# Patient Record
Sex: MALE | Race: WHITE | NOT HISPANIC OR LATINO
[De-identification: names, ages, dates, MRNs, and addresses within clinical notes are randomized per-mention and may not be internally consistent; named-entity substitution may affect disease eponyms.]

---

## 2017-10-13 ENCOUNTER — APPOINTMENT (OUTPATIENT)
Dept: RHEUMATOLOGY | Facility: CLINIC | Age: 82
End: 2017-10-13
Payer: MEDICARE

## 2017-10-13 VITALS
DIASTOLIC BLOOD PRESSURE: 82 MMHG | OXYGEN SATURATION: 97 % | HEIGHT: 63 IN | HEART RATE: 77 BPM | BODY MASS INDEX: 32.78 KG/M2 | SYSTOLIC BLOOD PRESSURE: 134 MMHG | WEIGHT: 185 LBS

## 2017-10-13 PROCEDURE — 99204 OFFICE O/P NEW MOD 45 MIN: CPT

## 2017-10-13 RX ORDER — CHROMIUM 200 MCG
1000 TABLET ORAL DAILY
Qty: 30 | Refills: 6 | Status: ACTIVE | COMMUNITY
Start: 2017-10-13 | End: 1900-01-01

## 2018-01-16 ENCOUNTER — APPOINTMENT (OUTPATIENT)
Dept: RHEUMATOLOGY | Facility: CLINIC | Age: 83
End: 2018-01-16
Payer: MEDICARE

## 2018-01-16 VITALS
DIASTOLIC BLOOD PRESSURE: 82 MMHG | OXYGEN SATURATION: 95 % | BODY MASS INDEX: 33.31 KG/M2 | HEIGHT: 63 IN | HEART RATE: 94 BPM | SYSTOLIC BLOOD PRESSURE: 158 MMHG | WEIGHT: 188 LBS

## 2018-01-16 PROCEDURE — 99215 OFFICE O/P EST HI 40 MIN: CPT

## 2018-03-12 ENCOUNTER — APPOINTMENT (OUTPATIENT)
Dept: RHEUMATOLOGY | Facility: CLINIC | Age: 83
End: 2018-03-12
Payer: MEDICARE

## 2018-03-12 VITALS — SYSTOLIC BLOOD PRESSURE: 162 MMHG | OXYGEN SATURATION: 96 % | DIASTOLIC BLOOD PRESSURE: 72 MMHG | HEART RATE: 95 BPM

## 2018-03-12 DIAGNOSIS — B02.29 OTHER POSTHERPETIC NERVOUS SYSTEM INVOLVEMENT: ICD-10-CM

## 2018-03-12 DIAGNOSIS — F33.0 MAJOR DEPRESSIVE DISORDER, RECURRENT, MILD: ICD-10-CM

## 2018-03-12 PROCEDURE — 99214 OFFICE O/P EST MOD 30 MIN: CPT

## 2018-05-14 ENCOUNTER — OUTPATIENT (OUTPATIENT)
Dept: OUTPATIENT SERVICES | Facility: HOSPITAL | Age: 83
LOS: 1 days | End: 2018-05-14
Payer: MEDICARE

## 2018-05-14 ENCOUNTER — APPOINTMENT (OUTPATIENT)
Dept: RHEUMATOLOGY | Facility: CLINIC | Age: 83
End: 2018-05-14
Payer: MEDICARE

## 2018-05-14 VITALS
BODY MASS INDEX: 34.38 KG/M2 | OXYGEN SATURATION: 95 % | HEIGHT: 63 IN | HEART RATE: 89 BPM | WEIGHT: 194 LBS | SYSTOLIC BLOOD PRESSURE: 181 MMHG | DIASTOLIC BLOOD PRESSURE: 94 MMHG

## 2018-05-14 DIAGNOSIS — Z98.89 OTHER SPECIFIED POSTPROCEDURAL STATES: Chronic | ICD-10-CM

## 2018-05-14 DIAGNOSIS — M19.90 UNSPECIFIED OSTEOARTHRITIS, UNSPECIFIED SITE: ICD-10-CM

## 2018-05-14 DIAGNOSIS — Z90.89 ACQUIRED ABSENCE OF OTHER ORGANS: Chronic | ICD-10-CM

## 2018-05-14 PROCEDURE — 99214 OFFICE O/P EST MOD 30 MIN: CPT

## 2018-05-14 PROCEDURE — 73562 X-RAY EXAM OF KNEE 3: CPT | Mod: 26,LT

## 2018-05-14 PROCEDURE — 73562 X-RAY EXAM OF KNEE 3: CPT

## 2018-06-19 ENCOUNTER — APPOINTMENT (OUTPATIENT)
Dept: ORTHOPEDIC SURGERY | Facility: CLINIC | Age: 83
End: 2018-06-19
Payer: MEDICARE

## 2018-06-19 PROCEDURE — 99203 OFFICE O/P NEW LOW 30 MIN: CPT

## 2018-07-16 ENCOUNTER — APPOINTMENT (OUTPATIENT)
Dept: ORTHOPEDIC SURGERY | Facility: CLINIC | Age: 83
End: 2018-07-16
Payer: MEDICARE

## 2018-07-16 VITALS — WEIGHT: 194 LBS | BODY MASS INDEX: 34.38 KG/M2 | HEIGHT: 63 IN

## 2018-07-16 PROCEDURE — 20610 DRAIN/INJ JOINT/BURSA W/O US: CPT | Mod: LT

## 2018-07-16 PROCEDURE — 99213 OFFICE O/P EST LOW 20 MIN: CPT | Mod: 25

## 2018-07-17 RX ORDER — NAPROXEN 250 MG/1
250 TABLET ORAL
Qty: 360 | Refills: 1 | Status: ACTIVE | COMMUNITY
Start: 2018-07-17 | End: 1900-01-01

## 2018-08-27 ENCOUNTER — APPOINTMENT (OUTPATIENT)
Dept: ORTHOPEDIC SURGERY | Facility: CLINIC | Age: 83
End: 2018-08-27
Payer: MEDICARE

## 2018-08-27 VITALS — HEIGHT: 63 IN | WEIGHT: 194 LBS | BODY MASS INDEX: 34.38 KG/M2

## 2018-08-27 PROCEDURE — 99213 OFFICE O/P EST LOW 20 MIN: CPT | Mod: 25

## 2018-08-27 PROCEDURE — 20610 DRAIN/INJ JOINT/BURSA W/O US: CPT | Mod: LT

## 2018-09-14 ENCOUNTER — APPOINTMENT (OUTPATIENT)
Dept: RHEUMATOLOGY | Facility: CLINIC | Age: 83
End: 2018-09-14
Payer: MEDICARE

## 2018-09-14 VITALS — OXYGEN SATURATION: 95 % | HEART RATE: 92 BPM | DIASTOLIC BLOOD PRESSURE: 88 MMHG | SYSTOLIC BLOOD PRESSURE: 156 MMHG

## 2018-09-14 PROCEDURE — 99215 OFFICE O/P EST HI 40 MIN: CPT

## 2018-09-14 RX ORDER — DICLOFENAC SODIUM 10 MG/G
1 GEL TOPICAL DAILY
Qty: 1 | Refills: 2 | Status: ACTIVE | COMMUNITY
Start: 2018-09-14 | End: 1900-01-01

## 2018-10-24 ENCOUNTER — RX RENEWAL (OUTPATIENT)
Age: 83
End: 2018-10-24

## 2018-10-24 RX ORDER — MULTIVIT-MIN/FOLIC/VIT K/LYCOP 400-300MCG
25 MCG TABLET ORAL
Qty: 30 | Refills: 0 | Status: ACTIVE | COMMUNITY
Start: 2018-10-24 | End: 1900-01-01

## 2018-11-02 ENCOUNTER — APPOINTMENT (OUTPATIENT)
Dept: RHEUMATOLOGY | Facility: CLINIC | Age: 83
End: 2018-11-02
Payer: MEDICARE

## 2018-11-02 ENCOUNTER — OUTPATIENT (OUTPATIENT)
Dept: OUTPATIENT SERVICES | Facility: HOSPITAL | Age: 83
LOS: 1 days | End: 2018-11-02
Payer: MEDICARE

## 2018-11-02 ENCOUNTER — APPOINTMENT (OUTPATIENT)
Dept: ULTRASOUND IMAGING | Facility: HOSPITAL | Age: 83
End: 2018-11-02

## 2018-11-02 VITALS
HEIGHT: 63 IN | WEIGHT: 186 LBS | OXYGEN SATURATION: 95 % | TEMPERATURE: 98.8 F | HEART RATE: 100 BPM | BODY MASS INDEX: 32.96 KG/M2 | SYSTOLIC BLOOD PRESSURE: 176 MMHG | DIASTOLIC BLOOD PRESSURE: 98 MMHG

## 2018-11-02 DIAGNOSIS — Z90.89 ACQUIRED ABSENCE OF OTHER ORGANS: Chronic | ICD-10-CM

## 2018-11-02 DIAGNOSIS — Z98.89 OTHER SPECIFIED POSTPROCEDURAL STATES: Chronic | ICD-10-CM

## 2018-11-02 PROCEDURE — 93971 EXTREMITY STUDY: CPT | Mod: 26,LT

## 2018-11-02 PROCEDURE — 93971 EXTREMITY STUDY: CPT

## 2018-11-02 PROCEDURE — 99214 OFFICE O/P EST MOD 30 MIN: CPT

## 2018-11-20 ENCOUNTER — APPOINTMENT (OUTPATIENT)
Dept: ORTHOPEDIC SURGERY | Facility: CLINIC | Age: 83
End: 2018-11-20
Payer: MEDICARE

## 2018-11-20 VITALS — BODY MASS INDEX: 32.96 KG/M2 | WEIGHT: 186 LBS | HEIGHT: 63 IN

## 2018-11-20 DIAGNOSIS — R00.9 UNSPECIFIED ABNORMALITIES OF HEART BEAT: ICD-10-CM

## 2018-11-20 DIAGNOSIS — Z86.59 PERSONAL HISTORY OF OTHER MENTAL AND BEHAVIORAL DISORDERS: ICD-10-CM

## 2018-11-20 DIAGNOSIS — Z86.69 PERSONAL HISTORY OF OTHER DISEASES OF THE NERVOUS SYSTEM AND SENSE ORGANS: ICD-10-CM

## 2018-11-20 DIAGNOSIS — Z87.2 PERSONAL HISTORY OF DISEASES OF THE SKIN AND SUBCUTANEOUS TISSUE: ICD-10-CM

## 2018-11-20 DIAGNOSIS — Z87.39 PERSONAL HISTORY OF OTHER DISEASES OF THE MUSCULOSKELETAL SYSTEM AND CONNECTIVE TISSUE: ICD-10-CM

## 2018-11-20 DIAGNOSIS — Z86.39 PERSONAL HISTORY OF OTHER ENDOCRINE, NUTRITIONAL AND METABOLIC DISEASE: ICD-10-CM

## 2018-11-20 DIAGNOSIS — N40.0 BENIGN PROSTATIC HYPERPLASIA WITHOUT LOWER URINARY TRACT SYMPMS: ICD-10-CM

## 2018-11-20 DIAGNOSIS — Z86.79 PERSONAL HISTORY OF OTHER DISEASES OF THE CIRCULATORY SYSTEM: ICD-10-CM

## 2018-11-20 PROCEDURE — 99213 OFFICE O/P EST LOW 20 MIN: CPT

## 2018-11-28 VITALS
OXYGEN SATURATION: 96 % | DIASTOLIC BLOOD PRESSURE: 84 MMHG | RESPIRATION RATE: 20 BRPM | HEART RATE: 70 BPM | HEIGHT: 63 IN | WEIGHT: 188.05 LBS | SYSTOLIC BLOOD PRESSURE: 164 MMHG | TEMPERATURE: 98 F

## 2018-11-28 RX ORDER — CHOLECALCIFEROL (VITAMIN D3) 125 MCG
1 CAPSULE ORAL
Qty: 0 | Refills: 0 | COMMUNITY

## 2018-11-28 RX ORDER — OXYCODONE HYDROCHLORIDE 5 MG/1
1 TABLET ORAL
Qty: 0 | Refills: 0 | COMMUNITY

## 2018-11-28 RX ORDER — PREGABALIN 225 MG/1
1 CAPSULE ORAL
Qty: 0 | Refills: 0 | COMMUNITY

## 2018-11-28 RX ORDER — RAMIPRIL 5 MG
1 CAPSULE ORAL
Qty: 0 | Refills: 0 | COMMUNITY

## 2018-11-28 RX ORDER — DULOXETINE HYDROCHLORIDE 30 MG/1
1 CAPSULE, DELAYED RELEASE ORAL
Qty: 0 | Refills: 0 | COMMUNITY

## 2018-11-28 RX ORDER — SILODOSIN 4 MG/1
1 CAPSULE ORAL
Qty: 0 | Refills: 0 | COMMUNITY

## 2018-11-28 NOTE — ASU PATIENT PROFILE, ADULT - PSH
H/O circumcision    History of loop recorder  2012, still in place  S/P hernia surgery  inguinal, right  S/P tonsillectomy

## 2018-11-28 NOTE — ASU PATIENT PROFILE, ADULT - PMH
Aortic stenosis    Arthritis  Reactive  Atrial fibrillation    Bladder stone    Cushings syndrome    Enlarged prostate    History of shingles  2017  HLD (hyperlipidemia)    HTN (hypertension)    JONAH (obstructive sleep apnea) Addiction to drug  opiod  Aortic stenosis    Arthritis  Reactive  Atrial fibrillation    Bladder stone    Cushings syndrome    Enlarged prostate    History of shingles  2017  HLD (hyperlipidemia)    HTN (hypertension)    JONAH (obstructive sleep apnea) Addiction to drug  opiod  Aortic stenosis    Arthritis  Reactive  Atrial fibrillation    Bladder stone    Cushings syndrome    Enlarged prostate    History of shingles  2017  HLD (hyperlipidemia)    HTN (hypertension)    Hypothyroidism    JONAH (obstructive sleep apnea) Addiction to drug  opioid  Aortic stenosis    Arthritis  Reactive  Atrial fibrillation    Bladder stone    Cushings syndrome    Enlarged prostate    History of shingles  2017  HLD (hyperlipidemia)    HTN (hypertension)    Hypothyroidism    JONAH (obstructive sleep apnea)

## 2018-11-29 ENCOUNTER — OUTPATIENT (OUTPATIENT)
Dept: OUTPATIENT SERVICES | Facility: HOSPITAL | Age: 83
LOS: 1 days | Discharge: HOME | End: 2018-11-29
Payer: MEDICARE

## 2018-11-29 ENCOUNTER — APPOINTMENT (OUTPATIENT)
Dept: ORTHOPEDIC SURGERY | Facility: HOSPITAL | Age: 83
End: 2018-11-29
Payer: MEDICARE

## 2018-11-29 VITALS
SYSTOLIC BLOOD PRESSURE: 148 MMHG | HEART RATE: 68 BPM | DIASTOLIC BLOOD PRESSURE: 86 MMHG | OXYGEN SATURATION: 95 % | RESPIRATION RATE: 16 BRPM

## 2018-11-29 DIAGNOSIS — Z98.89 OTHER SPECIFIED POSTPROCEDURAL STATES: Chronic | ICD-10-CM

## 2018-11-29 DIAGNOSIS — Z90.89 ACQUIRED ABSENCE OF OTHER ORGANS: Chronic | ICD-10-CM

## 2018-11-29 DIAGNOSIS — Z98.890 OTHER SPECIFIED POSTPROCEDURAL STATES: Chronic | ICD-10-CM

## 2018-11-29 PROCEDURE — 29881 ARTHRS KNE SRG MNISECTMY M/L: CPT | Mod: LT

## 2018-11-29 PROCEDURE — 29880 ARTHRS KNE SRG MNISECTMY M&L: CPT | Mod: LT

## 2018-11-29 RX ORDER — OXYCODONE HYDROCHLORIDE 5 MG/1
5 TABLET ORAL EVERY 4 HOURS
Qty: 0 | Refills: 0 | Status: DISCONTINUED | OUTPATIENT
Start: 2018-11-29 | End: 2018-11-29

## 2018-11-29 RX ORDER — MORPHINE SULFATE 50 MG/1
2 CAPSULE, EXTENDED RELEASE ORAL EVERY 4 HOURS
Qty: 0 | Refills: 0 | Status: DISCONTINUED | OUTPATIENT
Start: 2018-11-29 | End: 2018-11-29

## 2018-11-29 RX ADMIN — MORPHINE SULFATE 2 MILLIGRAM(S): 50 CAPSULE, EXTENDED RELEASE ORAL at 13:41

## 2018-11-29 RX ADMIN — MORPHINE SULFATE 2 MILLIGRAM(S): 50 CAPSULE, EXTENDED RELEASE ORAL at 14:12

## 2018-11-29 NOTE — BRIEF OPERATIVE NOTE - PROCEDURE
<<-----Click on this checkbox to enter Procedure Knee arthroscopy, left  11/29/2018  partial medial/lateral menisectomy, debridement  Active  [id number]

## 2018-12-03 PROBLEM — E03.9 HYPOTHYROIDISM, UNSPECIFIED: Chronic | Status: ACTIVE | Noted: 2018-11-29

## 2018-12-03 PROBLEM — F19.20 OTHER PSYCHOACTIVE SUBSTANCE DEPENDENCE, UNCOMPLICATED: Chronic | Status: ACTIVE | Noted: 2018-11-29

## 2018-12-03 PROBLEM — Z86.19 PERSONAL HISTORY OF OTHER INFECTIOUS AND PARASITIC DISEASES: Chronic | Status: ACTIVE | Noted: 2018-11-28

## 2018-12-03 PROBLEM — I35.0 NONRHEUMATIC AORTIC (VALVE) STENOSIS: Chronic | Status: ACTIVE | Noted: 2018-11-28

## 2018-12-04 ENCOUNTER — APPOINTMENT (OUTPATIENT)
Dept: ORTHOPEDIC SURGERY | Facility: CLINIC | Age: 83
End: 2018-12-04
Payer: MEDICARE

## 2018-12-04 PROCEDURE — 99024 POSTOP FOLLOW-UP VISIT: CPT

## 2018-12-11 ENCOUNTER — APPOINTMENT (OUTPATIENT)
Dept: ORTHOPEDIC SURGERY | Facility: CLINIC | Age: 83
End: 2018-12-11
Payer: MEDICARE

## 2018-12-11 PROCEDURE — 99024 POSTOP FOLLOW-UP VISIT: CPT

## 2019-01-01 ENCOUNTER — RX RENEWAL (OUTPATIENT)
Age: 84
End: 2019-01-01

## 2019-02-06 ENCOUNTER — APPOINTMENT (OUTPATIENT)
Dept: ORTHOPEDIC SURGERY | Facility: CLINIC | Age: 84
End: 2019-02-06
Payer: MEDICARE

## 2019-02-06 VITALS — WEIGHT: 186 LBS | HEIGHT: 63 IN | BODY MASS INDEX: 32.96 KG/M2

## 2019-02-06 DIAGNOSIS — M23.92 UNSPECIFIED INTERNAL DERANGEMENT OF LEFT KNEE: ICD-10-CM

## 2019-02-06 PROCEDURE — 99024 POSTOP FOLLOW-UP VISIT: CPT

## 2019-02-08 PROBLEM — M23.92 INTERNAL DERANGEMENT OF LEFT KNEE: Status: ACTIVE | Noted: 2018-06-19

## 2019-02-08 NOTE — HISTORY OF PRESENT ILLNESS
[de-identified] : Following up after left knee arthroscopy, continues to have similar pain to before the procedure but has had an overall decrease in his mechanical symptoms.

## 2019-02-08 NOTE — ASSESSMENT
[FreeTextEntry1] : Assessment\par #1 status post left knee arthroscopy\par \par Plan\par #1 continue physical therapy\par #2 refill medications\par #3 follow up 6 weeks\par #4 continue weight loss strategies

## 2019-02-08 NOTE — PHYSICAL EXAM
[de-identified] : General: Not in acute distress, dressed appropriately, sitting on examination table\par Skin: Warm and dry, normal turgor, no rashes\par Neurological: AOx3, Cranial nerves grossly in tact\par       Psych: Mood and affect appropriate\par \par Focused exam left Knee: No swelling edema erythema redness or drainage. Neutral Alignment. ROM: 0-125. AP and V/V Stable. Special Maneuvers Neg. 5/5 strength. Ambulates with normal gait without devices.

## 2019-02-26 ENCOUNTER — APPOINTMENT (OUTPATIENT)
Dept: ORTHOPEDIC SURGERY | Facility: CLINIC | Age: 84
End: 2019-02-26

## 2019-02-26 ENCOUNTER — RX RENEWAL (OUTPATIENT)
Age: 84
End: 2019-02-26

## 2019-02-28 ENCOUNTER — APPOINTMENT (OUTPATIENT)
Dept: RHEUMATOLOGY | Facility: CLINIC | Age: 84
End: 2019-02-28
Payer: MEDICARE

## 2019-02-28 VITALS
DIASTOLIC BLOOD PRESSURE: 94 MMHG | SYSTOLIC BLOOD PRESSURE: 194 MMHG | HEIGHT: 63 IN | OXYGEN SATURATION: 97 % | BODY MASS INDEX: 31.36 KG/M2 | TEMPERATURE: 98.7 F | WEIGHT: 177 LBS | HEART RATE: 80 BPM

## 2019-02-28 VITALS — SYSTOLIC BLOOD PRESSURE: 200 MMHG | DIASTOLIC BLOOD PRESSURE: 101 MMHG

## 2019-02-28 DIAGNOSIS — M02.30 REITER'S DISEASE, UNSPECIFIED SITE: ICD-10-CM

## 2019-02-28 PROCEDURE — 99214 OFFICE O/P EST MOD 30 MIN: CPT

## 2019-03-01 NOTE — DATA REVIEWED
[FreeTextEntry1] : XR Knee L 5/14/18:\par No bony abnormality. Preserved medial and lateral tibiofemoral joint spaces. 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 No discontinuity of any structure. \par Small to moderate joint effusion\par Some fluid decompressing from the joint capsule via the popliteus tendon sheath with imbibition of ht efluid into the popliteus muscle itself.

## 2019-03-01 NOTE — ASSESSMENT
[FreeTextEntry1] : 88 year old male presents for evaluation of previously diagnosed reactive arthritis.\par Patients remaining arthritic issues are due to OA of the knees, and his arthroscopy failed to relieve his symptoms. He is also struggling to stop using narcotics for pain which is an issue. \par At this time I recommend he continues to follow with Dr. Bonilla for pain management and Dr. Brandon for management of severe knee OA. \par Can follow up with me PRN. \par \par \par

## 2019-03-01 NOTE — PHYSICAL EXAM
[General Appearance - Alert] : alert [General Appearance - In No Acute Distress] : in no acute distress [Sclera] : the sclera and conjunctiva were normal [Outer Ear] : the ears and nose were normal in appearance [Examination Of The Oral Cavity] : the lips and gums were normal [Nasal Cavity] : the nasal mucosa and septum were normal [Respiration, Rhythm And Depth] : normal respiratory rhythm and effort [Auscultation Breath Sounds / Voice Sounds] : lungs were clear to auscultation bilaterally [Heart Rate And Rhythm] : heart rate was normal and rhythm regular [Heart Sounds] : normal S1 and S2 [Edema] : there was no peripheral edema [Abdomen Soft] : soft [Abdomen Tenderness] : non-tender [Cervical Lymph Nodes Enlarged Posterior Bilaterally] : posterior cervical [Cervical Lymph Nodes Enlarged Anterior Bilaterally] : anterior cervical [No Spinal Tenderness] : no spinal tenderness [Musculoskeletal - Swelling] : no joint swelling seen [Motor Tone] : muscle strength and tone were normal [Skin Color & Pigmentation] : normal skin color and pigmentation [] : no rash [Sensation] : the sensory exam was normal to light touch and pinprick [Motor Exam] : the motor exam was normal [Oriented To Time, Place, And Person] : oriented to person, place, and time [FreeTextEntry1] : flat affect, depressed mood

## 2019-03-01 NOTE — HISTORY OF PRESENT ILLNESS
[___ Month(s) Ago] : [unfilled] month(s) ago [Depression] : depression [FreeTextEntry1] : Office Visit 11/2/18;\par Has had injections in knee (Dr. Brandon and Dr. Javier) - both synvisc and steroid - he states the pain is worse now than before he got these injections. The Synvinsc helped his pain more. \par Using Voltaren/Aspercreme with relief.\par Knee pain is relieved after lying flat on back for 20-30min\par Saw Psych - Dr. Triplett - given concern for opioid addiction and depression, told there was no issues and he does not need to go back\par Plan: Patient with worsening L knee OA, now having undergone steroid and Synvisc injections. \par Likely surgery is the next step. \par Recommend PT also. \par Cymbalta - patient was not taking this. Will try this given evidence for pain relief in knee OA. \par US LLE for DVT. \par \par Office Visit 9/14/18:\par Still taking prednisone \par Still taking oxycodone \par Stiffness in his hand in the am occasionally in the RHS, no swelling. Was very stiff last week and he took Prednisone 20mg daily for two days without relief\par Cymbalta 30mg daily added by Dr. Bonilla, no relief \par PT EOD at home with relief\par Very depressed\par Plan: Main issue remains his depression. He does not complain of joint pain today. However he is developing fibromyalgia type symptoms. Will increase Cymbalta 30mg to twice daily for this. \par Discussed that he needs to address his opioid dependence also - at this time he has accepted this as an issue and feels he will be on this lifelong. \par Referral to psychiatry again - discussed the importance of this today again, at length. \par Voltaren gel and OA warming gloves. \par \par Office Visit 5/14/18:\par Patient hurt his knee ("twisted it") a few weeks ago. He was sitting at a table and he swung around to pick something up. He states it hurts when the knee is bent. Improvement with walking. \par Still taking Codeine.and no longer wants to quit this. Still feeling depressed. \par Never saw psychiatry but is still interested in this. \par No other joint pains today. \par Plan: now with acute onset knee pain after torsion with large effusion. \par - X Ray knee and Voltaren gel, may need MRI to evaluate for ligamentous or meniscal tear. May also have him return for aspiration pending XR results.\par - Voltaren gel PRN on knee\par - Psychiatry referral for depression and opioid dependence \par - Stop steroids. \par \par Office Visit 3/12/18:\par Ongoing pain in his R hand which he feels is improved from prior. No swelling. Most of pain is in palmar tendons and at base of R CMC. \par Trying to taper down off Codeine but is having a hard time dropping the dose. Endorses dependency issues. I have also discussed this with Dr. Bonilla and he recognizes the importance of taking this. \par Still feels depressed - Takes Cymbalta 30mg daily by Dr. Bonilla. \par Has not yet picked up Capsacian great for post herpetic neuralgia. \par Plan: Patient appears well today with regards to his arthritis - his joints appear stable with only minor signs of OA and he is asymptomatic. Will continue to taper prednisone by 1mg a month. Patient seems willing to taper at this time. 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 He notes he is trying but is addicted to it and having a hard time tapering it. I have discussed this with Dr. Bonilla also. Will refer to psychiatry who may be able to help with this - will also address his depression ideally. \par \par Office Visit 1/16/18:\par Patient was supposed to taper his prednisone by 1mg a month however he felt after a few weeks on 4mg that he had pain in the 4th webspace of his R hand thus he increased back to 6mg.\par Currently he denies any joint pains specifically but states that overall he just "aches"\par Patient suffered an episode of shingles across his anterior chest wall, now he feels it is itchy. \par He is concerned about his dependence to Codeine, which he gets from Dr. Bonilla for his lower back pain. He notes he is irritable when he does not take it. \par Patient seems notably depressed today. Flat affect and endorsing anhedonia. \par Plan: 1. Codeine dependence - patient would like to taper off this, I have recommended that he discuss with Dr. Bonilla. He clearly has a dependence on it and is still having ongoing lower back pain regardless of the medication. Will discuss with Dr. Bonilla as well. \par 2. Reactive arthritis - I think at this point patient's joint pains are due to osteoarthritis over reactive arthritis and I would therefore feel strongly that we begin to taper the Prednisone. Would like to go slowly given long term use. Have discussed symptoms that could occur with tapering, such as myalgias, URI symptoms and weakness. Have told patient to contact me should these symptoms occur. Have further advised him to notify me should he develop any intercurrent illnesses or require any surgery. Will taper by 1mg a month. \par 3. Depression - patient already has a referral to [facility]\par 4. Post herpetic neuralgia - capsacian cream \par \par Office Visit 10/13/17:\par Today he states he feels well and denies any joint pains. No swelling. Minimal morning stiffness.\par He is currently on Prednisone 6mg daily. Was waiting to taper down until he saw a physician. He denies any recent change of his symptoms. \par Takes Codeine for lower back pain. \par Hydroxychloroquine 200mg BID. \par Patient had shingles 3 weeks ago, took about 3/4 of a course of Valtrex. Then felt sick and stopped it. No more active lesions. \par Need to find DEXA scans and MRIs of L spine. \par Plan: Would like to begin to taper prednisone - will taper by 1mg a month. Appropriate tabs and taper directions provided today. \par Supplemental vitamin D ordered. We will obtain prior DEXA and MRI records. \par  [Anorexia] : no anorexia [Weight Loss] : no weight loss [Fever] : no fever [Chills] : no chills [Skin Lesions] : no lesions [Oral Ulcers] : no oral ulcers [Cough] : no cough [Dry Mouth] : no dry mouth [Shortness of Breath] : no shortness of breath [Chest Pain] : no chest pain [Falls] : no falls [Difficulty Standing] : no difficulty standing [Difficulty Walking] : no difficulty walking [Myalgias] : no myalgias [Dry Eyes] : no dry eyes

## 2019-03-01 NOTE — REVIEW OF SYSTEMS
[As Noted in HPI] : as noted in HPI [Sleep Disturbances] : sleep disturbances [Depression] : depression [Fever] : no fever [Chills] : no chills [Feeling Poorly] : not feeling poorly [Feeling Tired] : not feeling tired [Eye Pain] : no eye pain [Red Eyes] : eyes not red [Dry Eyes] : no dryness of the eyes [Earache] : no earache [Sore Throat] : no sore throat [Hoarseness] : no hoarseness [Chest Pain] : no chest pain [Palpitations] : no palpitations [Lower Ext Edema] : no extremity edema [Dysuria] : no dysuria [Incontinence] : no incontinence [Skin Lesions] : no skin lesions [Dizziness] : no dizziness [Fainting] : no fainting [Suicidal] : not suicidal [Anxiety] : no anxiety

## 2019-03-02 ENCOUNTER — TRANSCRIPTION ENCOUNTER (OUTPATIENT)
Age: 84
End: 2019-03-02

## 2019-03-03 ENCOUNTER — RX RENEWAL (OUTPATIENT)
Age: 84
End: 2019-03-03

## 2019-03-25 ENCOUNTER — APPOINTMENT (OUTPATIENT)
Dept: ORTHOPEDIC SURGERY | Facility: CLINIC | Age: 84
End: 2019-03-25
Payer: MEDICARE

## 2019-03-25 VITALS — HEIGHT: 63 IN | WEIGHT: 177 LBS | BODY MASS INDEX: 31.36 KG/M2

## 2019-03-25 PROCEDURE — 20610 DRAIN/INJ JOINT/BURSA W/O US: CPT | Mod: LT

## 2019-03-25 PROCEDURE — 99213 OFFICE O/P EST LOW 20 MIN: CPT | Mod: 25

## 2019-03-26 NOTE — ASSESSMENT
[FreeTextEntry1] : Assessment\par #1 left knee arthritis\par \par Plan\par #1 the left knee was injected as described above\par #2 followup 3 months or sooner p.r.n.

## 2019-03-26 NOTE — PHYSICAL EXAM
[de-identified] : General: Not in acute distress, dressed appropriately, sitting on examination table\par Skin: Warm and dry, normal turgor, no rashes\par Neurological: AOx3, Cranial nerves grossly in tact\par       Psych: Mood and affect appropriate\par \par Focused exam left Knee: No swelling edema erythema redness or drainage. Neutral Alignment. ROM: 0-125. AP and V/V Stable. Special Maneuvers Neg. 5/5 strength. Ambulates with normal gait without devices.

## 2019-03-26 NOTE — PROCEDURE
[de-identified] : After obtaining verbal consent and under the normal sterile conditions the left knee joint was injected with a solution of 4 cc of 1% lidocaine and 1 cc of 40 mg per mL of Kenalog. The patient tolerated the procedure well.

## 2019-04-11 ENCOUNTER — INPATIENT (INPATIENT)
Facility: HOSPITAL | Age: 84
LOS: 0 days | Discharge: HOME | DRG: 392 | End: 2019-04-12
Attending: INTERNAL MEDICINE | Admitting: INTERNAL MEDICINE
Payer: COMMERCIAL

## 2019-04-11 VITALS
HEIGHT: 61 IN | DIASTOLIC BLOOD PRESSURE: 87 MMHG | HEART RATE: 77 BPM | SYSTOLIC BLOOD PRESSURE: 180 MMHG | OXYGEN SATURATION: 97 % | WEIGHT: 178.57 LBS | TEMPERATURE: 98 F | RESPIRATION RATE: 18 BRPM

## 2019-04-11 DIAGNOSIS — Z98.89 OTHER SPECIFIED POSTPROCEDURAL STATES: Chronic | ICD-10-CM

## 2019-04-11 DIAGNOSIS — D69.6 THROMBOCYTOPENIA, UNSPECIFIED: ICD-10-CM

## 2019-04-11 DIAGNOSIS — Z90.89 ACQUIRED ABSENCE OF OTHER ORGANS: Chronic | ICD-10-CM

## 2019-04-11 DIAGNOSIS — M54.9 DORSALGIA, UNSPECIFIED: ICD-10-CM

## 2019-04-11 DIAGNOSIS — K62.5 HEMORRHAGE OF ANUS AND RECTUM: ICD-10-CM

## 2019-04-11 DIAGNOSIS — Z91.89 OTHER SPECIFIED PERSONAL RISK FACTORS, NOT ELSEWHERE CLASSIFIED: ICD-10-CM

## 2019-04-11 DIAGNOSIS — I10 ESSENTIAL (PRIMARY) HYPERTENSION: ICD-10-CM

## 2019-04-11 DIAGNOSIS — N40.0 BENIGN PROSTATIC HYPERPLASIA WITHOUT LOWER URINARY TRACT SYMPTOMS: ICD-10-CM

## 2019-04-11 DIAGNOSIS — E03.9 HYPOTHYROIDISM, UNSPECIFIED: ICD-10-CM

## 2019-04-11 DIAGNOSIS — Z98.890 OTHER SPECIFIED POSTPROCEDURAL STATES: Chronic | ICD-10-CM

## 2019-04-11 DIAGNOSIS — E78.5 HYPERLIPIDEMIA, UNSPECIFIED: ICD-10-CM

## 2019-04-11 DIAGNOSIS — D64.9 ANEMIA, UNSPECIFIED: ICD-10-CM

## 2019-04-11 DIAGNOSIS — Z29.9 ENCOUNTER FOR PROPHYLACTIC MEASURES, UNSPECIFIED: ICD-10-CM

## 2019-04-11 LAB
ALBUMIN SERPL ELPH-MCNC: 3.8 G/DL — SIGNIFICANT CHANGE UP (ref 3.3–5)
ALP SERPL-CCNC: 58 U/L — SIGNIFICANT CHANGE UP (ref 40–120)
ALT FLD-CCNC: 15 U/L — SIGNIFICANT CHANGE UP (ref 10–45)
ANION GAP SERPL CALC-SCNC: 10 MMOL/L — SIGNIFICANT CHANGE UP (ref 5–17)
APTT BLD: 21.6 SEC — LOW (ref 27.5–36.3)
AST SERPL-CCNC: 22 U/L — SIGNIFICANT CHANGE UP (ref 10–40)
BASOPHILS # BLD AUTO: 0.02 K/UL — SIGNIFICANT CHANGE UP (ref 0–0.2)
BASOPHILS NFR BLD AUTO: 0.3 % — SIGNIFICANT CHANGE UP (ref 0–2)
BILIRUB SERPL-MCNC: 0.4 MG/DL — SIGNIFICANT CHANGE UP (ref 0.2–1.2)
BLD GP AB SCN SERPL QL: NEGATIVE — SIGNIFICANT CHANGE UP
BUN SERPL-MCNC: 18 MG/DL — SIGNIFICANT CHANGE UP (ref 7–23)
CALCIUM SERPL-MCNC: 9.1 MG/DL — SIGNIFICANT CHANGE UP (ref 8.4–10.5)
CHLORIDE SERPL-SCNC: 106 MMOL/L — SIGNIFICANT CHANGE UP (ref 96–108)
CO2 SERPL-SCNC: 23 MMOL/L — SIGNIFICANT CHANGE UP (ref 22–31)
CREAT SERPL-MCNC: 0.85 MG/DL — SIGNIFICANT CHANGE UP (ref 0.5–1.3)
EOSINOPHIL # BLD AUTO: 0.54 K/UL — HIGH (ref 0–0.5)
EOSINOPHIL NFR BLD AUTO: 8.4 % — HIGH (ref 0–6)
GLUCOSE SERPL-MCNC: 97 MG/DL — SIGNIFICANT CHANGE UP (ref 70–99)
HCT VFR BLD CALC: 34.5 % — LOW (ref 39–50)
HGB BLD-MCNC: 11 G/DL — LOW (ref 13–17)
IMM GRANULOCYTES NFR BLD AUTO: 0.5 % — SIGNIFICANT CHANGE UP (ref 0–1.5)
INR BLD: 1.04 — SIGNIFICANT CHANGE UP (ref 0.88–1.16)
LYMPHOCYTES # BLD AUTO: 1.41 K/UL — SIGNIFICANT CHANGE UP (ref 1–3.3)
LYMPHOCYTES # BLD AUTO: 21.9 % — SIGNIFICANT CHANGE UP (ref 13–44)
MCHC RBC-ENTMCNC: 29 PG — SIGNIFICANT CHANGE UP (ref 27–34)
MCHC RBC-ENTMCNC: 31.9 GM/DL — LOW (ref 32–36)
MCV RBC AUTO: 91 FL — SIGNIFICANT CHANGE UP (ref 80–100)
MONOCYTES # BLD AUTO: 0.49 K/UL — SIGNIFICANT CHANGE UP (ref 0–0.9)
MONOCYTES NFR BLD AUTO: 7.6 % — SIGNIFICANT CHANGE UP (ref 2–14)
NEUTROPHILS # BLD AUTO: 3.96 K/UL — SIGNIFICANT CHANGE UP (ref 1.8–7.4)
NEUTROPHILS NFR BLD AUTO: 61.3 % — SIGNIFICANT CHANGE UP (ref 43–77)
NRBC # BLD: 0 /100 WBCS — SIGNIFICANT CHANGE UP (ref 0–0)
PLATELET # BLD AUTO: 127 K/UL — LOW (ref 150–400)
POTASSIUM SERPL-MCNC: 4.4 MMOL/L — SIGNIFICANT CHANGE UP (ref 3.5–5.3)
POTASSIUM SERPL-SCNC: 4.4 MMOL/L — SIGNIFICANT CHANGE UP (ref 3.5–5.3)
PROT SERPL-MCNC: 6.2 G/DL — SIGNIFICANT CHANGE UP (ref 6–8.3)
PROTHROM AB SERPL-ACNC: 11.8 SEC — SIGNIFICANT CHANGE UP (ref 10–12.9)
RBC # BLD: 3.79 M/UL — LOW (ref 4.2–5.8)
RBC # FLD: 14.4 % — SIGNIFICANT CHANGE UP (ref 10.3–14.5)
RH IG SCN BLD-IMP: POSITIVE — SIGNIFICANT CHANGE UP
SODIUM SERPL-SCNC: 139 MMOL/L — SIGNIFICANT CHANGE UP (ref 135–145)
WBC # BLD: 6.45 K/UL — SIGNIFICANT CHANGE UP (ref 3.8–10.5)
WBC # FLD AUTO: 6.45 K/UL — SIGNIFICANT CHANGE UP (ref 3.8–10.5)

## 2019-04-11 PROCEDURE — 99285 EMERGENCY DEPT VISIT HI MDM: CPT | Mod: 25

## 2019-04-11 PROCEDURE — 93010 ELECTROCARDIOGRAM REPORT: CPT

## 2019-04-11 PROCEDURE — 74177 CT ABD & PELVIS W/CONTRAST: CPT | Mod: 26

## 2019-04-11 RX ORDER — ATORVASTATIN CALCIUM 80 MG/1
10 TABLET, FILM COATED ORAL AT BEDTIME
Qty: 0 | Refills: 0 | Status: DISCONTINUED | OUTPATIENT
Start: 2019-04-11 | End: 2019-04-12

## 2019-04-11 RX ORDER — OXYCODONE HYDROCHLORIDE 5 MG/1
10 TABLET ORAL EVERY 6 HOURS
Qty: 0 | Refills: 0 | Status: DISCONTINUED | OUTPATIENT
Start: 2019-04-11 | End: 2019-04-12

## 2019-04-11 RX ORDER — SOD SULF/SODIUM/NAHCO3/KCL/PEG
2000 SOLUTION, RECONSTITUTED, ORAL ORAL ONCE
Qty: 0 | Refills: 0 | Status: COMPLETED | OUTPATIENT
Start: 2019-04-12 | End: 2019-04-12

## 2019-04-11 RX ORDER — FINASTERIDE 5 MG/1
5 TABLET, FILM COATED ORAL DAILY
Qty: 0 | Refills: 0 | Status: DISCONTINUED | OUTPATIENT
Start: 2019-04-11 | End: 2019-04-12

## 2019-04-11 RX ORDER — PREGABALIN 225 MG/1
500 CAPSULE ORAL EVERY 24 HOURS
Qty: 0 | Refills: 0 | Status: DISCONTINUED | OUTPATIENT
Start: 2019-04-11 | End: 2019-04-12

## 2019-04-11 RX ORDER — GABAPENTIN 400 MG/1
300 CAPSULE ORAL AT BEDTIME
Qty: 0 | Refills: 0 | Status: DISCONTINUED | OUTPATIENT
Start: 2019-04-11 | End: 2019-04-11

## 2019-04-11 RX ORDER — CHOLECALCIFEROL (VITAMIN D3) 125 MCG
5000 CAPSULE ORAL DAILY
Qty: 0 | Refills: 0 | Status: DISCONTINUED | OUTPATIENT
Start: 2019-04-11 | End: 2019-04-12

## 2019-04-11 RX ORDER — CARVEDILOL PHOSPHATE 80 MG/1
3.12 CAPSULE, EXTENDED RELEASE ORAL EVERY 12 HOURS
Qty: 0 | Refills: 0 | Status: DISCONTINUED | OUTPATIENT
Start: 2019-04-11 | End: 2019-04-12

## 2019-04-11 RX ORDER — SOD SULF/SODIUM/NAHCO3/KCL/PEG
2000 SOLUTION, RECONSTITUTED, ORAL ORAL ONCE
Qty: 0 | Refills: 0 | Status: COMPLETED | OUTPATIENT
Start: 2019-04-11 | End: 2019-04-11

## 2019-04-11 RX ORDER — DULOXETINE HYDROCHLORIDE 30 MG/1
60 CAPSULE, DELAYED RELEASE ORAL DAILY
Qty: 0 | Refills: 0 | Status: DISCONTINUED | OUTPATIENT
Start: 2019-04-11 | End: 2019-04-12

## 2019-04-11 RX ORDER — OXYCODONE HYDROCHLORIDE 5 MG/1
30 TABLET ORAL EVERY 12 HOURS
Qty: 0 | Refills: 0 | Status: DISCONTINUED | OUTPATIENT
Start: 2019-04-11 | End: 2019-04-12

## 2019-04-11 RX ORDER — GABAPENTIN 400 MG/1
3 CAPSULE ORAL
Qty: 0 | Refills: 0 | COMMUNITY

## 2019-04-11 RX ORDER — LEVOTHYROXINE SODIUM 125 MCG
50 TABLET ORAL DAILY
Qty: 0 | Refills: 0 | Status: DISCONTINUED | OUTPATIENT
Start: 2019-04-11 | End: 2019-04-12

## 2019-04-11 RX ORDER — GABAPENTIN 400 MG/1
1 CAPSULE ORAL
Qty: 0 | Refills: 0 | COMMUNITY

## 2019-04-11 RX ORDER — IOHEXOL 300 MG/ML
30 INJECTION, SOLUTION INTRAVENOUS ONCE
Qty: 0 | Refills: 0 | Status: COMPLETED | OUTPATIENT
Start: 2019-04-11 | End: 2019-04-11

## 2019-04-11 RX ORDER — TAMSULOSIN HYDROCHLORIDE 0.4 MG/1
0.4 CAPSULE ORAL AT BEDTIME
Qty: 0 | Refills: 0 | Status: DISCONTINUED | OUTPATIENT
Start: 2019-04-11 | End: 2019-04-12

## 2019-04-11 RX ORDER — LISINOPRIL 2.5 MG/1
20 TABLET ORAL DAILY
Qty: 0 | Refills: 0 | Status: DISCONTINUED | OUTPATIENT
Start: 2019-04-11 | End: 2019-04-12

## 2019-04-11 RX ORDER — GABAPENTIN 400 MG/1
900 CAPSULE ORAL AT BEDTIME
Qty: 0 | Refills: 0 | Status: DISCONTINUED | OUTPATIENT
Start: 2019-04-11 | End: 2019-04-12

## 2019-04-11 RX ADMIN — TAMSULOSIN HYDROCHLORIDE 0.4 MILLIGRAM(S): 0.4 CAPSULE ORAL at 22:29

## 2019-04-11 RX ADMIN — FINASTERIDE 5 MILLIGRAM(S): 5 TABLET, FILM COATED ORAL at 22:31

## 2019-04-11 RX ADMIN — OXYCODONE HYDROCHLORIDE 30 MILLIGRAM(S): 5 TABLET ORAL at 23:02

## 2019-04-11 RX ADMIN — IOHEXOL 30 MILLILITER(S): 300 INJECTION, SOLUTION INTRAVENOUS at 13:36

## 2019-04-11 RX ADMIN — DULOXETINE HYDROCHLORIDE 60 MILLIGRAM(S): 30 CAPSULE, DELAYED RELEASE ORAL at 22:22

## 2019-04-11 RX ADMIN — OXYCODONE HYDROCHLORIDE 30 MILLIGRAM(S): 5 TABLET ORAL at 22:32

## 2019-04-11 RX ADMIN — Medication 2000 MILLILITER(S): at 23:00

## 2019-04-11 RX ADMIN — ATORVASTATIN CALCIUM 10 MILLIGRAM(S): 80 TABLET, FILM COATED ORAL at 22:29

## 2019-04-11 RX ADMIN — OXYCODONE HYDROCHLORIDE 10 MILLIGRAM(S): 5 TABLET ORAL at 17:58

## 2019-04-11 RX ADMIN — LISINOPRIL 20 MILLIGRAM(S): 2.5 TABLET ORAL at 22:29

## 2019-04-11 RX ADMIN — GABAPENTIN 900 MILLIGRAM(S): 400 CAPSULE ORAL at 22:29

## 2019-04-11 RX ADMIN — PREGABALIN 500 MICROGRAM(S): 225 CAPSULE ORAL at 22:21

## 2019-04-11 RX ADMIN — CARVEDILOL PHOSPHATE 3.12 MILLIGRAM(S): 80 CAPSULE, EXTENDED RELEASE ORAL at 22:31

## 2019-04-11 NOTE — ED ADULT NURSE NOTE - NSIMPLEMENTINTERV_GEN_ALL_ED
Implemented All Universal Safety Interventions:  [place] to call system. Call bell, personal items and telephone within reach. Instruct patient to call for assistance. Room bathroom lighting operational. Non-slip footwear when patient is off stretcher. Physically safe environment: no spills, clutter or unnecessary equipment. Stretcher in lowest position, wheels locked, appropriate side rails in place.

## 2019-04-11 NOTE — H&P ADULT - PROBLEM SELECTOR PLAN 10
1) PCP Contacted on Admission: (Y/N) --> Name & Phone #: Spoke with Dr. Dunn and Dr. Pruitt  2) Date of Contact with PCP: 4/11/19  3) PCP Contacted at Discharge: (Y/N)  4) Summary of Handoff Given to PCP:   5) Post-Discharge Appointment Date and Location:

## 2019-04-11 NOTE — H&P ADULT - PROBLEM SELECTOR PLAN 3
Unclear whether patient currently on any anti-hypertensives. Will clarify with PMD and pharmacy.    Possible that elevated BP is 2/2 medication noncompliance vs. opioid withdrawal. Pt currently prescribed Ramipril and Carvedilol but unclear whether or not he is compliant.    Possible that elevated BP is 2/2 medication noncompliance vs. opioid withdrawal.  C/w home antihypertensives given no active GI bleed and stable hemoglobin and currently hypertensive.  C/w home opioids to avoid withdrawal Pt currently prescribed Ramipril and Carvedilol.  Possible that elevated BP is 2/2 medication noncompliance vs. opioid withdrawal.  C/w home antihypertensives given no active GI bleed and stable hemoglobin and currently hypertensive.  C/w home opioids to avoid withdrawal

## 2019-04-11 NOTE — H&P ADULT - PROBLEM SELECTOR PLAN 4
Continue home medications oxycodone, oxycontin, gabapentin, duloxetine. Etiology unclear. Daily CBC to trend.  Will send blue top platelet count in the AM

## 2019-04-11 NOTE — H&P ADULT - NSHPSOCIALHISTORY_GEN_ALL_CORE
Pt lives in an apartment with wife. Walks with a cane. Denies any alcohol or tobacco use. Has history of opioid dependence.

## 2019-04-11 NOTE — ED PROVIDER NOTE - CLINICAL SUMMARY MEDICAL DECISION MAKING FREE TEXT BOX
Pt with rectal bleeding two days prior now resolved sent by Dr. Jeter for ct abd/pelvis and admission with GI consult Dr. Pruitt for possible colonscopy tomorrow.  Pt stable in ED with no active bleeding.  Hgb 11.0.  Seen by Dr. Pruitt.  Discussed with Corona who agrees on admission.

## 2019-04-11 NOTE — H&P ADULT - NSHPPHYSICALEXAM_GEN_ALL_CORE
VITAL SIGNS:  T(F): 98 (04-11-19 @ 15:50), Max: 98.2 (04-11-19 @ 15:16)  HR: 84 (04-11-19 @ 15:50) (69 - 84)  BP: 176/87 (04-11-19 @ 15:50) (176/87 - 195/99)  BP(mean): --  RR: 17 (04-11-19 @ 15:50) (17 - 18)  SpO2: 97% (04-11-19 @ 15:50) (96% - 98%)    PHYSICAL EXAM:    Constitutional: elderly male, sitting comfortably in NAD, WDWN;   HEENT: NC/AT, EOMI, anicteric sclera, no nasal discharge; uvula midline, no oropharyngeal erythema or exudates; MMM  Neck: supple; no JVD or thyromegaly  Respiratory: CTA B/L; no W/R/R, no retractions  Cardiac: +S1/S2; RRR; no M/R/G;   Gastrointestinal: soft,  non-distended; mild discomfort with palpation of , no rebound or guarding; +BSx4  Back: curvature of spine, no bony tenderness or step-offs; no CVAT B/L  Lymphatic: no submandibular or cervical LAD  Neurologic: AAOx3; CNII-XII grossly intact; no focal deficits VITAL SIGNS:  T(F): 98 (04-11-19 @ 15:50), Max: 98.2 (04-11-19 @ 15:16)  HR: 84 (04-11-19 @ 15:50) (69 - 84)  BP: 176/87 (04-11-19 @ 15:50) (176/87 - 195/99)  BP(mean): --  RR: 17 (04-11-19 @ 15:50) (17 - 18)  SpO2: 97% (04-11-19 @ 15:50) (96% - 98%)    PHYSICAL EXAM:  Constitutional: elderly male, sitting comfortably in NAD, WDWN;   HEENT: NC/AT, EOMI, anicteric sclera, no nasal discharge; uvula midline, no oropharyngeal erythema or exudates; MMM  Neck: supple; no JVD or thyromegaly  Respiratory: CTA B/L; no W/R/R, no retractions  Cardiac: +S1/S2; RRR; no M/R/G;   Gastrointestinal: soft,  non-distended; mild discomfort with palpation of all 4 quadrants, no rebound or guarding; +BSx4  Back: curvature of spine, no bony tenderness or step-offs; no CVAT B/L  Lymphatic: no submandibular or cervical LAD  Neurologic: AAOx3; CNII-XII grossly intact; no focal deficits

## 2019-04-11 NOTE — PROGRESS NOTE ADULT - SUBJECTIVE AND OBJECTIVE BOX
I examined the patient today, reviewed the lab data, xrays and additional studies. Additionally I have reviewed the notes and assessments by the residents and consultants.   At this point I agree with the assessments and plan.  I would also advise close observation, and supportive care.  GI evaluation

## 2019-04-11 NOTE — H&P ADULT - PROBLEM SELECTOR PLAN 6
Pt on Rapaflo 4mg and Finasteride 5 mg at home. C/w Flomax 0.4 mg and Finasteride while inpatient. Continue home med atorvastatin.

## 2019-04-11 NOTE — CONSULT NOTE ADULT - ASSESSMENT
rectal bleed in setting of NSAIDs use ? diverticular bleed.  Admit to monitor Hgb  prep for colonoscopy if more bleeding in order to ascertain if hemostasis required

## 2019-04-11 NOTE — H&P ADULT - PROBLEM SELECTOR PLAN 7
Etiology unclear. Daily CBC to trend. Pt on Rapaflo 4mg and Finasteride 5 mg at home. C/w Flomax 0.4 mg and Finasteride while inpatient.

## 2019-04-11 NOTE — ED ADULT TRIAGE NOTE - CHIEF COMPLAINT QUOTE
pt referred by MD Dunn, pt c/o rectal bleeding for 2 days. + occult blood at PCP's office. pt denies dizziness, chills, cp. reports feeling weak.

## 2019-04-11 NOTE — H&P ADULT - PROBLEM SELECTOR PLAN 5
Continue home med atorvastatin. Continue home medications oxycodone, oxycontin, gabapentin, duloxetine.  Medications verified with pharmacy.  Notified Dr. Pruitt about patient's opioid use. Patient able to take these meds prior to colonoscopy.

## 2019-04-11 NOTE — ED PROVIDER NOTE - OBJECTIVE STATEMENT
89 y/o m with PMH of HTN, HLD, hypothyroid, JONAH, BPH presents to ED from Dr. Jeter's office for evaluation.  As per pt, he was constipated two days prior, then strained and had some blood in stool yesterday morning.  Since then he has had two bowel movements with no blood.  Dr. Jeter performed occult stool which was positive.  Pt denies abd pain, syncope, weakness.  No prior hx of rectal bleeding.  No chest pain or shortness of breath.  Last colonscopy four years prior which was normal.

## 2019-04-11 NOTE — ED ADULT NURSE NOTE - PMH
Addiction to drug  opioid  Aortic stenosis    Arthritis  Reactive  Atrial fibrillation    Bladder stone    Cushings syndrome    Enlarged prostate    History of shingles  2017  HLD (hyperlipidemia)    HTN (hypertension)    Hypothyroidism    JONAH (obstructive sleep apnea)

## 2019-04-11 NOTE — H&P ADULT - NSHPLABSRESULTS_GEN_ALL_CORE
LABS:                         11.0   6.45  )-----------( 127      ( 11 Apr 2019 12:46 )             34.5     04-11    139  |  106  |  18  ----------------------------<  97  4.4   |  23  |  0.85    Ca    9.1      11 Apr 2019 12:46    TPro  6.2  /  Alb  3.8  /  TBili  0.4  /  DBili  x   /  AST  22  /  ALT  15  /  AlkPhos  58  04-11    PT/INR - ( 11 Apr 2019 14:01 )   PT: 11.8 sec;   INR: 1.04          PTT - ( 11 Apr 2019 14:01 )  PTT:21.6 sec              RADIOLOGY, EKG & ADDITIONAL TESTS: Reviewed.   EKG 4/11: Normal sinus rhythm without ischemic changes.

## 2019-04-11 NOTE — H&P ADULT - HISTORY OF PRESENT ILLNESS
Pt is a 87 y/o male with PMHx of HTN, HLD, BPH sent in to the ED from Dr. Lacy's office for further evaluation of rectal bleeding. Pt has history of constipation and first noticed the bleeding about 2 days ago after straining during a bowel movement. Pt states that the blood was both in the bowl and mixed with the stool. The bleeding persisted for the duration of a day but has seems to have resolved, as pt has had additional non-bloody bowel movements since then.  He did not have any pain associated with the bleeding but notes that he is on a number of pain medications. He has no prior history of rectal bleeding and had an unremarkable colonoscopy 4 years ago. He had a follow-up visit earlier today with Dr. Lacy, at which time a fecal occult stool test was found to be positive.     In the ED, VS T 97.6 HR 77 /87 RR 18 SpO2 97% on RA. Pt was seen by GI Dr. Pruitt. Labs were remarkable for a normocytic anemia hgb 11 and thrombocytopenia plt 127, coag studies were unremarkable. CT of the abdomen and pelvis showed colonic diverticulosis. Pt is a 89 y/o male with PMHx of HTN, HLD, BPH sent in to the ED from Dr. Dunn's office for further evaluation of rectal bleeding. Pt has history of constipation and first noticed the bleeding about 2 days ago after straining during a bowel movement. Pt states that the blood was both in the bowl and mixed with the stool. The bleeding persisted for the duration of a day but has seems to have resolved, as pt has had additional non-bloody bowel movements since then.  He did not have any pain associated with the bleeding but notes that he is on a number of pain medications. He has no prior history of rectal bleeding and had an unremarkable colonoscopy 4 years ago. He had a follow-up visit earlier today with Dr. Dunn, at which time a fecal occult stool test was found to be positive.     In the ED, VS T 97.6 HR 77 /87 RR 18 SpO2 97% on RA. Pt was seen by GI Dr. Pruitt. Labs were remarkable for a normocytic anemia hgb 11 and thrombocytopenia plt 127, coag studies were unremarkable. CT of the abdomen and pelvis showed colonic diverticulosis.

## 2019-04-11 NOTE — H&P ADULT - NSICDXPASTSURGICALHX_GEN_ALL_CORE_FT
PAST SURGICAL HISTORY:  H/O circumcision     History of loop recorder 2012, still in place    S/P hernia surgery inguinal, right    S/P tonsillectomy

## 2019-04-11 NOTE — H&P ADULT - PROBLEM SELECTOR PLAN 9
No hep sub q given hx of GI bleed, SCDs   F no fluids euvolemic  E maintain K>4 Mg>2  N Clear diet until midnight, NPO after midnight

## 2019-04-11 NOTE — H&P ADULT - PROBLEM SELECTOR PLAN 8
Patient currently prescribed Synthroid but unclear if compliant. Continue with home medications.  TSH in AM Patient currently prescribed Synthroid. Continue with home medications.  TSH in AM

## 2019-04-11 NOTE — H&P ADULT - PROBLEM SELECTOR PLAN 2
Likely 2/2 to blood loss. Daily CBC to trend. Likely 2/2 to blood loss. Daily CBC to trend.  Iron studies for the AM

## 2019-04-11 NOTE — ED ADULT NURSE NOTE - CHPI ED NUR SYMPTOMS NEG
no fever/no dysuria/no chills/no vomiting/no diarrhea/no abdominal distension/no burning urination/no hematuria/no nausea

## 2019-04-11 NOTE — H&P ADULT - NSICDXPASTMEDICALHX_GEN_ALL_CORE_FT
PAST MEDICAL HISTORY:  Addiction to drug opioid    Aortic stenosis     Arthritis Reactive    Atrial fibrillation     Bladder stone     Cushings syndrome     Enlarged prostate     History of shingles 2017    HLD (hyperlipidemia)     HTN (hypertension)     Hypothyroidism     JONAH (obstructive sleep apnea)

## 2019-04-11 NOTE — ED ADULT NURSE NOTE - OBJECTIVE STATEMENT
Pt reports straining for a BM yesterday and had "blood in the toilet." Pt reports normal BM today however went to his doctor and was sent to ED to r/o hemorrhoids vs cancer vs internal bleeding. Pt denies chest pain, sob, dizziness, n/v, diarrhea. Pt reports weakness.

## 2019-04-11 NOTE — H&P ADULT - ASSESSMENT
Pt is a 87 y/o male with PMHx of HTN, HLD, BPH admitted for evaluation of rectal bleeding. Differential includes diverticulosis vs. NSAID use vs. hemorrhoids vs. AVM vs. colon CA (unlikely given recent normal colonoscopy).

## 2019-04-11 NOTE — H&P ADULT - NSICDXFAMILYHX_GEN_ALL_CORE_FT
FAMILY HISTORY:  FH: ADHD (attention deficit hyperactivity disorder)  FH: Parkinson's disease    Father  Still living? No  FH: heart disease, Age at diagnosis: Age Unknown    Mother  Still living? No  Family history of heart cancer, Age at diagnosis: Age Unknown

## 2019-04-11 NOTE — CONSULT NOTE ADULT - SUBJECTIVE AND OBJECTIVE BOX
HPI:  Pt is a 87 y/o male with PMHx of HTN, HLD, BPH sent in to the ED from Dr. Dunn's office for further evaluation of rectal bleeding. Pt has history of constipation and first noticed the bleeding about 2 days ago after straining during a bowel movement. He has no prior history of rectal bleeding and had few colonoscopies he cannot recall last one but Dr Higgins did it he does not remember if he has diverticulosis. Admits to using Naprosyn frequently He had a follow-up visit earlier today with Dr. Dunn, at which time rectal exam showed blood    I      REVIEW OF SYSTEMS:  Constitutional: No fever, weight loss or fatigue  ENMT:  No difficulty hearing, tinnitus, vertigo; No sinus or throat pain  Respiratory: No cough, wheezing, chills or hemoptysis  Cardiovascular: No chest pain, palpitations, dizziness or leg swelling  Gastrointestinal: No abdominal or epigastric pain. No nausea, vomiting or hematemesis; No diarrhea or constipation. + hematochezia.  Skin: No itching, burning, rashes or lesions   Musculoskeletal: No joint pain or swelling; No muscle, back or extremity pain    PAST MEDICAL & SURGICAL HISTORY:  Hypothyroidism  Addiction to drug: opioid  Aortic stenosis  History of shingles: 2017  Cushings syndrome  Atrial fibrillation  HLD (hyperlipidemia)  HTN (hypertension)  Enlarged prostate  Arthritis: Reactive  JONAH (obstructive sleep apnea)  Bladder stone  History of loop recorder: 2012, still in place  H/O circumcision  S/P hernia surgery: inguinal, right  S/P tonsillectomy      FAMILY HISTORY:  FH: Parkinson's disease  FH: ADHD (attention deficit hyperactivity disorder)  Family history of heart cancer (Mother)  FH: heart disease (Father)      SOCIAL HISTORY:  Smoking Status: [ ] Current, [ ] Former, [ ] Never  Pack Years:    MEDICATIONS:  MEDICATIONS  (STANDING):  atorvastatin 10 milliGRAM(s) Oral at bedtime  carvedilol 3.125 milliGRAM(s) Oral every 12 hours  cholecalciferol 5000 Unit(s) Oral daily  cyanocobalamin 500 MICROGram(s) Oral every 24 hours  DULoxetine 60 milliGRAM(s) Oral daily  finasteride 5 milliGRAM(s) Oral daily  gabapentin 900 milliGRAM(s) Oral at bedtime  levothyroxine 50 MICROGram(s) Oral daily  lisinopril 20 milliGRAM(s) Oral daily  oxyCODONE  ER Tablet 30 milliGRAM(s) Oral every 12 hours  polyethylene glycol/electrolyte Solution. 2000 milliLiter(s) Oral once  tamsulosin 0.4 milliGRAM(s) Oral at bedtime    MEDICATIONS  (PRN):  oxyCODONE    IR 10 milliGRAM(s) Oral every 6 hours PRN Moderate Pain (4 - 6)      Allergies    penicillin (Anaphylaxis)    Intolerances        Vital Signs Last 24 Hrs  T(C): 37.1 (11 Apr 2019 19:51), Max: 37.1 (11 Apr 2019 19:51)  T(F): 98.7 (11 Apr 2019 19:51), Max: 98.7 (11 Apr 2019 19:51)  HR: 90 (11 Apr 2019 19:51) (69 - 95)  BP: 169/97 (11 Apr 2019 19:51) (166/98 - 195/99)  BP(mean): --  RR: 19 (11 Apr 2019 19:51) (16 - 19)  SpO2: 98% (11 Apr 2019 19:51) (95% - 100%)        PHYSICAL EXAM:    General: pale elderly gentleman; in no acute distress  HEENT: MMM, conjunctiva and sclera clear  Gastrointestinal: Soft, non-tender obese non-distended; Normal bowel sounds; No rebound or guarding  Extremities: Normal range of motion, No clubbing, cyanosis or edema  Neurological: Alert and oriented x3  Skin: Warm and dry. No obvious rash      LABS:                        11.0   6.45  )-----------( 127      ( 11 Apr 2019 12:46 )             34.5     04-11    139  |  106  |  18  ----------------------------<  97  4.4   |  23  |  0.85    Ca    9.1      11 Apr 2019 12:46    TPro  6.2  /  Alb  3.8  /  TBili  0.4  /  DBili  x   /  AST  22  /  ALT  15  /  AlkPhos  58  04-11          RADIOLOGY & ADDITIONAL STUDIES:

## 2019-04-11 NOTE — H&P ADULT - PROBLEM SELECTOR PLAN 1
-Pt has not had any obvious bleeding in past 2 days, though POC FOBT positive.  -Likely 2/2 diverticulosis. Pt takes Naprosyn 500 mg 4 times daily which could also result in GI bleeding but this is more consistent with melena than hematochezia.    -GI following, NPO after midnight, pt scheduled for colonoscopy tomorrow. F/u GI recs. -Pt has not had any obvious bleeding in past 2 days, though POC FOBT positive.  -Likely 2/2 diverticulosis. Pt takes Naprosyn 500 mg 4 times daily which could also result in GI bleeding but this is more consistent with melena than hematochezia.    -GI following, NPO after midnight, pt scheduled for colonoscopy tomorrow. F/u GI recs.  - maintain active type and screen  - daily CBC   - will be prepped tonight for colonoscopy with golytely 2000 in split doses; first dose to occur at 7pm

## 2019-04-12 ENCOUNTER — TRANSCRIPTION ENCOUNTER (OUTPATIENT)
Age: 84
End: 2019-04-12

## 2019-04-12 VITALS
OXYGEN SATURATION: 95 % | TEMPERATURE: 98 F | HEART RATE: 78 BPM | DIASTOLIC BLOOD PRESSURE: 70 MMHG | SYSTOLIC BLOOD PRESSURE: 127 MMHG | RESPIRATION RATE: 18 BRPM

## 2019-04-12 LAB
ANION GAP SERPL CALC-SCNC: 11 MMOL/L — SIGNIFICANT CHANGE UP (ref 5–17)
BASOPHILS # BLD AUTO: 0.03 K/UL — SIGNIFICANT CHANGE UP (ref 0–0.2)
BASOPHILS NFR BLD AUTO: 0.5 % — SIGNIFICANT CHANGE UP (ref 0–2)
BLD GP AB SCN SERPL QL: NEGATIVE — SIGNIFICANT CHANGE UP
BUN SERPL-MCNC: 15 MG/DL — SIGNIFICANT CHANGE UP (ref 7–23)
CALCIUM SERPL-MCNC: 9.4 MG/DL — SIGNIFICANT CHANGE UP (ref 8.4–10.5)
CHLORIDE SERPL-SCNC: 102 MMOL/L — SIGNIFICANT CHANGE UP (ref 96–108)
CLOSURE TME COLL+EPINEP BLD: 107 K/UL — LOW (ref 150–400)
CO2 SERPL-SCNC: 28 MMOL/L — SIGNIFICANT CHANGE UP (ref 22–31)
CREAT SERPL-MCNC: 0.88 MG/DL — SIGNIFICANT CHANGE UP (ref 0.5–1.3)
EOSINOPHIL # BLD AUTO: 0.5 K/UL — SIGNIFICANT CHANGE UP (ref 0–0.5)
EOSINOPHIL NFR BLD AUTO: 9.1 % — HIGH (ref 0–6)
FERRITIN SERPL-MCNC: 174 NG/ML — SIGNIFICANT CHANGE UP (ref 30–400)
GLUCOSE SERPL-MCNC: 97 MG/DL — SIGNIFICANT CHANGE UP (ref 70–99)
HCT VFR BLD CALC: 35 % — LOW (ref 39–50)
HGB BLD-MCNC: 10.9 G/DL — LOW (ref 13–17)
IMM GRANULOCYTES NFR BLD AUTO: 0.4 % — SIGNIFICANT CHANGE UP (ref 0–1.5)
IRON SATN MFR SERPL: 22 % — SIGNIFICANT CHANGE UP (ref 16–55)
IRON SATN MFR SERPL: 54 UG/DL — SIGNIFICANT CHANGE UP (ref 45–165)
LYMPHOCYTES # BLD AUTO: 1.52 K/UL — SIGNIFICANT CHANGE UP (ref 1–3.3)
LYMPHOCYTES # BLD AUTO: 27.6 % — SIGNIFICANT CHANGE UP (ref 13–44)
MAGNESIUM SERPL-MCNC: 2.1 MG/DL — SIGNIFICANT CHANGE UP (ref 1.6–2.6)
MCHC RBC-ENTMCNC: 28.8 PG — SIGNIFICANT CHANGE UP (ref 27–34)
MCHC RBC-ENTMCNC: 31.1 GM/DL — LOW (ref 32–36)
MCV RBC AUTO: 92.6 FL — SIGNIFICANT CHANGE UP (ref 80–100)
MONOCYTES # BLD AUTO: 0.54 K/UL — SIGNIFICANT CHANGE UP (ref 0–0.9)
MONOCYTES NFR BLD AUTO: 9.8 % — SIGNIFICANT CHANGE UP (ref 2–14)
NEUTROPHILS # BLD AUTO: 2.89 K/UL — SIGNIFICANT CHANGE UP (ref 1.8–7.4)
NEUTROPHILS NFR BLD AUTO: 52.6 % — SIGNIFICANT CHANGE UP (ref 43–77)
NRBC # BLD: 0 /100 WBCS — SIGNIFICANT CHANGE UP (ref 0–0)
PLATELET # BLD AUTO: 131 K/UL — LOW (ref 150–400)
POTASSIUM SERPL-MCNC: 4 MMOL/L — SIGNIFICANT CHANGE UP (ref 3.5–5.3)
POTASSIUM SERPL-SCNC: 4 MMOL/L — SIGNIFICANT CHANGE UP (ref 3.5–5.3)
RBC # BLD: 3.78 M/UL — LOW (ref 4.2–5.8)
RBC # FLD: 14.4 % — SIGNIFICANT CHANGE UP (ref 10.3–14.5)
RH IG SCN BLD-IMP: POSITIVE — SIGNIFICANT CHANGE UP
SODIUM SERPL-SCNC: 141 MMOL/L — SIGNIFICANT CHANGE UP (ref 135–145)
TIBC SERPL-MCNC: 248 UG/DL — SIGNIFICANT CHANGE UP (ref 220–430)
TRANSFERRIN SERPL-MCNC: 196 MG/DL — LOW (ref 200–360)
TSH SERPL-MCNC: 3.49 UIU/ML — SIGNIFICANT CHANGE UP (ref 0.35–4.94)
UIBC SERPL-MCNC: 194 UG/DL — SIGNIFICANT CHANGE UP (ref 110–370)
WBC # BLD: 5.5 K/UL — SIGNIFICANT CHANGE UP (ref 3.8–10.5)
WBC # FLD AUTO: 5.5 K/UL — SIGNIFICANT CHANGE UP (ref 3.8–10.5)

## 2019-04-12 PROCEDURE — 83540 ASSAY OF IRON: CPT

## 2019-04-12 PROCEDURE — 45378 DIAGNOSTIC COLONOSCOPY: CPT

## 2019-04-12 PROCEDURE — 85025 COMPLETE CBC W/AUTO DIFF WBC: CPT

## 2019-04-12 PROCEDURE — 74177 CT ABD & PELVIS W/CONTRAST: CPT

## 2019-04-12 PROCEDURE — 84443 ASSAY THYROID STIM HORMONE: CPT

## 2019-04-12 PROCEDURE — 85610 PROTHROMBIN TIME: CPT

## 2019-04-12 PROCEDURE — 99285 EMERGENCY DEPT VISIT HI MDM: CPT | Mod: 25

## 2019-04-12 PROCEDURE — 80053 COMPREHEN METABOLIC PANEL: CPT

## 2019-04-12 PROCEDURE — 93005 ELECTROCARDIOGRAM TRACING: CPT

## 2019-04-12 PROCEDURE — 86850 RBC ANTIBODY SCREEN: CPT

## 2019-04-12 PROCEDURE — 86901 BLOOD TYPING SEROLOGIC RH(D): CPT

## 2019-04-12 PROCEDURE — 85730 THROMBOPLASTIN TIME PARTIAL: CPT

## 2019-04-12 PROCEDURE — 82728 ASSAY OF FERRITIN: CPT

## 2019-04-12 PROCEDURE — 83550 IRON BINDING TEST: CPT

## 2019-04-12 PROCEDURE — 80048 BASIC METABOLIC PNL TOTAL CA: CPT

## 2019-04-12 PROCEDURE — 36415 COLL VENOUS BLD VENIPUNCTURE: CPT

## 2019-04-12 PROCEDURE — 84466 ASSAY OF TRANSFERRIN: CPT

## 2019-04-12 PROCEDURE — 86900 BLOOD TYPING SEROLOGIC ABO: CPT

## 2019-04-12 PROCEDURE — 83735 ASSAY OF MAGNESIUM: CPT

## 2019-04-12 RX ORDER — DICLOFENAC SODIUM 75 MG/1
1 TABLET, DELAYED RELEASE ORAL
Qty: 0 | Refills: 0 | COMMUNITY

## 2019-04-12 RX ADMIN — Medication 2000 MILLILITER(S): at 05:06

## 2019-04-12 RX ADMIN — CARVEDILOL PHOSPHATE 3.12 MILLIGRAM(S): 80 CAPSULE, EXTENDED RELEASE ORAL at 05:06

## 2019-04-12 RX ADMIN — Medication 50 MICROGRAM(S): at 05:06

## 2019-04-12 RX ADMIN — LISINOPRIL 20 MILLIGRAM(S): 2.5 TABLET ORAL at 05:06

## 2019-04-12 RX ADMIN — OXYCODONE HYDROCHLORIDE 10 MILLIGRAM(S): 5 TABLET ORAL at 00:30

## 2019-04-12 RX ADMIN — OXYCODONE HYDROCHLORIDE 10 MILLIGRAM(S): 5 TABLET ORAL at 00:00

## 2019-04-12 RX ADMIN — OXYCODONE HYDROCHLORIDE 10 MILLIGRAM(S): 5 TABLET ORAL at 06:12

## 2019-04-12 NOTE — DISCHARGE NOTE PROVIDER - HOSPITAL COURSE
Pt is a 87 y/o male with PMHx of HTN, HLD, BPH admitted for evaluation of rectal bleeding. Pt has history of constipation and first noticed the bleeding about 2 days ago after straining during a bowel movement. No obvious bleeding in past 2 days. CT on admission showing colonic diverticulosis without evidence of diverticulitis; Mild intrahepatic and extrahepatic ductal dilatation with common bile duct and dilatation of the main pancreatic duct without evidence of obstructing     mass., indeterminate 1.7 cm hypodense lesion within the left adrenal gland. GI was consulted who recommended colonoscopy. Colonoscopy revealed: xxxx    Patient was medically optimized, stable and ready for discharge. Plan of care and return precautions were discussed with the patient who verbally stated understanding. Pt is a 89 y/o male with PMHx of HTN, HLD, BPH admitted for evaluation of rectal bleeding. Pt has history of constipation and first noticed the bleeding about 2 days ago after straining during a bowel movement. No obvious bleeding in past 2 days. CT on admission showing colonic diverticulosis without evidence of diverticulitis; Mild intrahepatic and extrahepatic ductal dilatation with common bile duct and dilatation of the main pancreatic duct without evidence of obstructing mass., indeterminate 1.7 cm hypodense lesion within the left adrenal gland. GI was consulted who recommended colonoscopy. Colonoscopy revealed diverticulosis. Patient was medically optimized, stable and ready for discharge. Plan of care and return precautions were discussed with the patient who verbally stated understanding.

## 2019-04-12 NOTE — DISCHARGE NOTE PROVIDER - NSDCFUADDAPPT_GEN_ALL_CORE_FT
Please follow up with Dr. Dunn on Thursday 4/18, at 10AM. Please follow up with Dr. Dunn on Monday 4/15, at 10AM.

## 2019-04-12 NOTE — DISCHARGE NOTE PROVIDER - INSTRUCTIONS
Eat a high-fiber diet when you have diverticulosis. Fiber softens the stool and helps prevent constipation. It also can help decrease pressure in the colon and help prevent flare-ups of diverticulitis.    High-fiber foods include:  Beans and legumes  Bran, whole wheat bread and whole grain cereals such as oatmeal  Brown and wild rice  Fruits such as apples, bananas and pears  Vegetables such as broccoli, carrots, corn and squash  Whole wheat pasta

## 2019-04-12 NOTE — DISCHARGE NOTE PROVIDER - NSDCCPCAREPLAN_GEN_ALL_CORE_FT
PRINCIPAL DISCHARGE DIAGNOSIS  Diagnosis: Rectal bleeding  Assessment and Plan of Treatment: You came in because you had bleeding from your rectum. You were seem by Dr. Pruitt, a gastrointenstinal specialist, who completed a colonoscopy for you. Your bleeding is likely from diverticula of your colon, which were found on a CT scan of your abdomen. Diverticula are small, bulging pouches that can form in the lining of your digestive system. They are found most often in the lower part of the large intestine (colon). Diverticula are common, especially after age 40, and seldom cause problems. Rarely, diverticula may bleed, causing blood in the stool.      SECONDARY DISCHARGE DIAGNOSES  Diagnosis: Lesion of adrenal gland  Assessment and Plan of Treatment: You had a 1.7 cm lesion on your left adrenal gland, which was an incidental finding on your CT scan of your abdomen. Please speak with your primary care doctor, Dr. Dunn, regarding the need for a follow up image. If necessary, an adrenal protocol CT or MRI can be obtained in 12 months.    Diagnosis: Pancreatic duct dilated  Assessment and Plan of Treatment: Your pancreative duct was dilated 0.6cm, which was an incidental finding on your CT scan of your abdomen. Please speak with your primary care doctor, Dr. Dunn, regarding the need for a follow up image. If necessary, an MRI/MRCP can be obtained.    Diagnosis: Dilation of biliary tract  Assessment and Plan of Treatment: Your bile ducts was dilated 1.3cm, which was an incidental finding on your CT scan of your abdomen. Please speak with your primary care doctor, Dr. Dunn, regarding the need for a follow up image. If necessary, an MRI/MRCP can be obtained. PRINCIPAL DISCHARGE DIAGNOSIS  Diagnosis: Rectal bleeding  Assessment and Plan of Treatment: You came in because you had bleeding from your rectum. You were seem by Dr. Pruitt, a gastrointenstinal specialist, who completed a colonoscopy for you. Your bleeding is likely from diverticula of your colon, which were found on a CT scan of your abdomen. Diverticula are small, bulging pouches that can form in the lining of your digestive system. They are found most often in the lower part of the large intestine (colon). Diverticula are common, especially after age 40, and seldom cause problems. Rarely, diverticula may bleed, causing blood in the stool.  Please stop taking naprosyn and diclofenac. This can cause ulcers and worsen bleeding. Please follow up with your primary care provider.      SECONDARY DISCHARGE DIAGNOSES  Diagnosis: Lesion of adrenal gland  Assessment and Plan of Treatment: You had a 1.7 cm lesion on your left adrenal gland, which was an incidental finding on your CT scan of your abdomen. Please speak with your primary care doctor, Dr. Dunn, regarding the need for a follow up image. If necessary, an adrenal protocol CT or MRI can be obtained in 12 months.    Diagnosis: Pancreatic duct dilated  Assessment and Plan of Treatment: Your pancreative duct was dilated 0.6cm, which was an incidental finding on your CT scan of your abdomen. Please speak with your primary care doctor, Dr. Dunn, regarding the need for a follow up image. If necessary, an MRI/MRCP can be obtained.    Diagnosis: Dilation of biliary tract  Assessment and Plan of Treatment: Your bile ducts was dilated 1.3cm, which was an incidental finding on your CT scan of your abdomen. Please speak with your primary care doctor, Dr. Dunn, regarding the need for a follow up image. If necessary, an MRI/MRCP can be obtained.

## 2019-04-12 NOTE — DISCHARGE NOTE PROVIDER - CARE PROVIDER_API CALL
Neal Dunn)  Internal Medicine  [address] [address]  Phone: [phone]  Fax: [phone]  Follow Up Time: 2 weeks

## 2019-04-12 NOTE — PROGRESS NOTE ADULT - SUBJECTIVE AND OBJECTIVE BOX
Pt seen and examined   colonoascopy today  no bleed    REVIEW OF SYSTEMS:  Constitutional: No fever, weight loss or fatigue  Cardiovascular: No chest pain, palpitations, dizziness or leg swelling  Gastrointestinal: No abdominal or epigastric pain. No nausea, vomiting or hematemesis; No diarrhea or constipation. No melena or hematochezia.  Skin: No itching, burning, rashes or lesions       MEDICATIONS:  MEDICATIONS  (STANDING):  atorvastatin 10 milliGRAM(s) Oral at bedtime  carvedilol 3.125 milliGRAM(s) Oral every 12 hours  cholecalciferol 5000 Unit(s) Oral daily  cyanocobalamin 500 MICROGram(s) Oral every 24 hours  DULoxetine 60 milliGRAM(s) Oral daily  finasteride 5 milliGRAM(s) Oral daily  gabapentin 900 milliGRAM(s) Oral at bedtime  levothyroxine 50 MICROGram(s) Oral daily  lisinopril 20 milliGRAM(s) Oral daily  oxyCODONE  ER Tablet 30 milliGRAM(s) Oral every 12 hours  tamsulosin 0.4 milliGRAM(s) Oral at bedtime    MEDICATIONS  (PRN):  oxyCODONE    IR 10 milliGRAM(s) Oral every 6 hours PRN Moderate Pain (4 - 6)      Allergies    penicillin (Anaphylaxis)    Intolerances        Vital Signs Last 24 Hrs  T(C): 36.7 (12 Apr 2019 04:59), Max: 37.1 (11 Apr 2019 19:51)  T(F): 98 (12 Apr 2019 04:59), Max: 98.7 (11 Apr 2019 19:51)  HR: 78 (12 Apr 2019 04:59) (69 - 95)  BP: 127/70 (12 Apr 2019 04:59) (127/70 - 195/99)  BP(mean): --  RR: 18 (12 Apr 2019 04:59) (16 - 19)  SpO2: 95% (12 Apr 2019 04:59) (95% - 100%)      PHYSICAL EXAM:    General: Well developed; well nourished; in no acute distress  HEENT: MMM, conjunctiva and sclera clear  Gastrointestinal: Soft non-tender non-distended; Normal bowel sounds; No hepatosplenomegaly  Skin: Warm and dry. No obvious rash    LABS:      CBC Full  -  ( 12 Apr 2019 05:53 )  WBC Count : 5.50 K/uL  RBC Count : 3.78 M/uL  Hemoglobin : 10.9 g/dL  Hematocrit : 35.0 %  Platelet Count - Automated : 131 K/uL  Mean Cell Volume : 92.6 fl  Mean Cell Hemoglobin : 28.8 pg  Mean Cell Hemoglobin Concentration : 31.1 gm/dL  Auto Neutrophil # : 2.89 K/uL  Auto Lymphocyte # : 1.52 K/uL  Auto Monocyte # : 0.54 K/uL  Auto Eosinophil # : 0.50 K/uL  Auto Basophil # : 0.03 K/uL  Auto Neutrophil % : 52.6 %  Auto Lymphocyte % : 27.6 %  Auto Monocyte % : 9.8 %  Auto Eosinophil % : 9.1 %  Auto Basophil % : 0.5 %    04-12    141  |  102  |  15  ----------------------------<  97  4.0   |  28  |  0.88    Ca    9.4      12 Apr 2019 05:53  Mg     2.1     04-12    TPro  6.2  /  Alb  3.8  /  TBili  0.4  /  DBili  x   /  AST  22  /  ALT  15  /  AlkPhos  58  04-11    PT/INR - ( 11 Apr 2019 14:01 )   PT: 11.8 sec;   INR: 1.04          PTT - ( 11 Apr 2019 14:01 )  PTT:21.6 sec                  RADIOLOGY & ADDITIONAL STUDIES (The following images were personally reviewed):

## 2019-04-12 NOTE — DISCHARGE NOTE PROVIDER - NSDCCPTREATMENT_GEN_ALL_CORE_FT
PRINCIPAL PROCEDURE  Procedure: Colonoscopy in adult  Findings and Treatment: PRINCIPAL PROCEDURE  Procedure: Colonoscopy in adult  Findings and Treatment: Diverticulosis found

## 2019-04-12 NOTE — DISCHARGE NOTE NURSING/CASE MANAGEMENT/SOCIAL WORK - NSDCDPATPORTLINK_GEN_ALL_CORE
You can access the [facility] Patient Portal, offered by [facility], by registering with the following website: [email or web address]

## 2019-04-15 ENCOUNTER — CHART COPY (OUTPATIENT)
Age: 84
End: 2019-04-15

## 2019-04-15 DIAGNOSIS — G89.29 OTHER CHRONIC PAIN: ICD-10-CM

## 2019-04-15 DIAGNOSIS — D50.0 IRON DEFICIENCY ANEMIA SECONDARY TO BLOOD LOSS (CHRONIC): ICD-10-CM

## 2019-04-15 DIAGNOSIS — M54.9 DORSALGIA, UNSPECIFIED: ICD-10-CM

## 2019-04-15 DIAGNOSIS — K64.9 UNSPECIFIED HEMORRHOIDS: ICD-10-CM

## 2019-04-15 DIAGNOSIS — N40.0 BENIGN PROSTATIC HYPERPLASIA WITHOUT LOWER URINARY TRACT SYMPTOMS: ICD-10-CM

## 2019-04-15 DIAGNOSIS — E78.5 HYPERLIPIDEMIA, UNSPECIFIED: ICD-10-CM

## 2019-04-15 DIAGNOSIS — I48.91 UNSPECIFIED ATRIAL FIBRILLATION: ICD-10-CM

## 2019-04-15 DIAGNOSIS — E27.9 DISORDER OF ADRENAL GLAND, UNSPECIFIED: ICD-10-CM

## 2019-04-15 DIAGNOSIS — I10 ESSENTIAL (PRIMARY) HYPERTENSION: ICD-10-CM

## 2019-04-15 DIAGNOSIS — G47.33 OBSTRUCTIVE SLEEP APNEA (ADULT) (PEDIATRIC): ICD-10-CM

## 2019-04-15 DIAGNOSIS — I35.0 NONRHEUMATIC AORTIC (VALVE) STENOSIS: ICD-10-CM

## 2019-04-15 DIAGNOSIS — K62.5 HEMORRHAGE OF ANUS AND RECTUM: ICD-10-CM

## 2019-04-15 DIAGNOSIS — F11.21 OPIOID DEPENDENCE, IN REMISSION: ICD-10-CM

## 2019-04-15 DIAGNOSIS — E24.9 CUSHING'S SYNDROME, UNSPECIFIED: ICD-10-CM

## 2019-04-15 DIAGNOSIS — E03.9 HYPOTHYROIDISM, UNSPECIFIED: ICD-10-CM

## 2019-04-15 DIAGNOSIS — Q44.5 OTHER CONGENITAL MALFORMATIONS OF BILE DUCTS: ICD-10-CM

## 2019-04-15 DIAGNOSIS — K57.30 DIVERTICULOSIS OF LARGE INTESTINE WITHOUT PERFORATION OR ABSCESS WITHOUT BLEEDING: ICD-10-CM

## 2019-04-15 DIAGNOSIS — D69.6 THROMBOCYTOPENIA, UNSPECIFIED: ICD-10-CM

## 2019-04-15 DIAGNOSIS — K86.89 OTHER SPECIFIED DISEASES OF PANCREAS: ICD-10-CM

## 2019-04-29 ENCOUNTER — MOBILE ON CALL (OUTPATIENT)
Age: 84
End: 2019-04-29

## 2019-06-11 ENCOUNTER — APPOINTMENT (OUTPATIENT)
Dept: ORTHOPEDIC SURGERY | Facility: CLINIC | Age: 84
End: 2019-06-11
Payer: MEDICARE

## 2019-06-11 PROCEDURE — 99213 OFFICE O/P EST LOW 20 MIN: CPT | Mod: 25

## 2019-06-11 PROCEDURE — 20610 DRAIN/INJ JOINT/BURSA W/O US: CPT | Mod: LT

## 2019-06-11 NOTE — HISTORY OF PRESENT ILLNESS
[de-identified] : 88 male with left knee pain who has failed arthroscopic treatment laft november. he did have good relief from synvisc last july and would like to try that again now.\par \par \par synvisc one\par left knee\par lot: [id number]\par exp: 2021-04

## 2019-06-11 NOTE — PHYSICAL EXAM
[de-identified] : left knee:\par periarticular TTP\par valgus clinical alignment\par medial laxity\par mild soft tissue swelling [de-identified] : L knee:\par pan articular arthritic changes

## 2019-06-11 NOTE — PROCEDURE
[Injection] : Injection [Left] : of the left [Knee Joint] : knee joint [Joint Pain] : Joint pain [Patient] : patient [Risk] : risk [Benefits] : benefits [Alternatives] : alternatives [Bleeding] : bleeding [Infection] : infection [Allergic Reaction] : allergic reaction [Verbal Consent Obtained] : verbal consent was obtained prior to the procedure [Alcohol] : Alcohol [Betadine] : Betadine [Lateral] : lateral [20] : a 20-gauge [Tolerated Well] : The patient tolerated the procedure well [Bandage Applied] : a bandage [None] : none [FreeTextEntry8] : synvisc

## 2019-06-11 NOTE — ASSESSMENT
[FreeTextEntry1] : plan:\par -injected left knee with synvisc\par -f/u in 3-6 weeks to evaluate for effectiveness....

## 2019-07-22 ENCOUNTER — FORM ENCOUNTER (OUTPATIENT)
Age: 84
End: 2019-07-22

## 2019-07-23 ENCOUNTER — OUTPATIENT (OUTPATIENT)
Dept: OUTPATIENT SERVICES | Facility: HOSPITAL | Age: 84
LOS: 1 days | End: 2019-07-23
Payer: MEDICARE

## 2019-07-23 ENCOUNTER — APPOINTMENT (OUTPATIENT)
Dept: ORTHOPEDIC SURGERY | Facility: CLINIC | Age: 84
End: 2019-07-23
Payer: MEDICARE

## 2019-07-23 DIAGNOSIS — Z90.89 ACQUIRED ABSENCE OF OTHER ORGANS: Chronic | ICD-10-CM

## 2019-07-23 DIAGNOSIS — Z98.890 OTHER SPECIFIED POSTPROCEDURAL STATES: Chronic | ICD-10-CM

## 2019-07-23 DIAGNOSIS — Z98.89 OTHER SPECIFIED POSTPROCEDURAL STATES: Chronic | ICD-10-CM

## 2019-07-23 PROCEDURE — 73562 X-RAY EXAM OF KNEE 3: CPT

## 2019-07-23 PROCEDURE — 99213 OFFICE O/P EST LOW 20 MIN: CPT

## 2019-07-23 PROCEDURE — 73562 X-RAY EXAM OF KNEE 3: CPT | Mod: 26,LT

## 2019-07-23 NOTE — HISTORY OF PRESENT ILLNESS
[de-identified] : 88 male 6w s/p L knee synvisc injection. he feels much better and has no complaints at this time.

## 2019-07-23 NOTE — PHYSICAL EXAM
[de-identified] : left knee:\par ROm 0-125\par 15 degrees valgus alignment\par no joint line tenderness\par  [de-identified] : left knee:\par valgus knee alignment with lateral compartment OA

## 2019-07-23 NOTE — ASSESSMENT
[FreeTextEntry1] : plan:\par -follow up at 3 months if pain returns\par All medical record entries made by the PA/Scribe/Fellow are at my, Dr. Alec Brandon's direction and personally dictated by me on 07/23/2019]. I have reviewed the chart and agree that the record accurately reflects my personal performance of the history, physical exam, assessment, and plan. I have also personally directed reviewed, and agreed with the chart.\par

## 2019-07-23 NOTE — REASON FOR VISIT
[Follow-Up Visit] : a follow-up visit for [Knee Pain] : knee pain [Osteoarthritis, Knee] : osteoarthritis, knee [FreeTextEntry2] : left knee

## 2019-08-13 ENCOUNTER — APPOINTMENT (OUTPATIENT)
Dept: ORTHOPEDIC SURGERY | Facility: CLINIC | Age: 84
End: 2019-08-13
Payer: MEDICARE

## 2019-08-13 DIAGNOSIS — M70.60 TROCHANTERIC BURSITIS, UNSPECIFIED HIP: ICD-10-CM

## 2019-08-13 PROCEDURE — 99213 OFFICE O/P EST LOW 20 MIN: CPT | Mod: 25

## 2019-08-13 PROCEDURE — 20610 DRAIN/INJ JOINT/BURSA W/O US: CPT | Mod: RT

## 2019-08-13 NOTE — HISTORY OF PRESENT ILLNESS
[de-identified] : Here today for a right greater trochanter bursitis injection. No other complaints at this time.

## 2019-08-13 NOTE — ASSESSMENT
[FreeTextEntry1] : Assessment/plan\par Right trochanter bursitis; the area was injected as described above, he will followup as needed.\par \par All medical record entries made by the PA/Torsten/Fellow are at my, Dr. Alec Brandon's direction and personally dictated by me on 08/13/2019]. I have reviewed the chart and agree that the record accurately reflects my personal performance of the history, physical exam, assessment, and plan. I have also personally directed reviewed, and agreed with the chart.\par

## 2019-08-13 NOTE — PROCEDURE
[de-identified] : After obtaining verbal consent and under the normal sterile conditions the right greater trochanter was injected with 4 cc of lidocaine and 1 cc of Kenalog.

## 2019-08-13 NOTE — PHYSICAL EXAM
[de-identified] : General: Not in acute distress, dressed appropriately, sitting on examination table\par Skin: Warm and dry, normal turgor, no rashes\par Neurological: AOx3, Cranial nerves grossly in tact\par Psych: Mood and affect appropriate\par \par Right Hip: No swelling edema erythema redness or drainage. Tender over the right greater trochanter. ROM: Hip flexion 120, abduction 30, int/ext rotation 45. Painless range of motion. 5/5 strength. Normal gait. \par \par

## 2019-10-16 ENCOUNTER — APPOINTMENT (OUTPATIENT)
Dept: ORTHOPEDIC SURGERY | Facility: CLINIC | Age: 84
End: 2019-10-16
Payer: MEDICARE

## 2019-10-16 PROCEDURE — 20610 DRAIN/INJ JOINT/BURSA W/O US: CPT | Mod: LT

## 2019-10-16 PROCEDURE — 99212 OFFICE O/P EST SF 10 MIN: CPT | Mod: 25

## 2019-10-18 NOTE — ASSESSMENT
[FreeTextEntry1] : Assessment/Plan\par \par Left knee arthritis\par \par plan:\par -injected left knee with steroid\par -f/u in 3-6 weeks to evaluate for effectiveness and possibly inject withVisco supplementation.

## 2019-10-18 NOTE — PROCEDURE
[de-identified] : Procedure: Injection of the left knee joint. \par Indication:  Joint pain. \par Risk, benefits and alternatives were discussed with the patient. Potential complications include bleeding, infection and allergic reaction. Verbal consent was obtained prior to the procedure. \par Alcohol and Betadine was used to prep the area. Using sterile technique, the aspiration/injection needle was then directed from a lateral aspect. A 20-gauge was used to inject 4 cc of lidocaine and 1 cc of 40 mg per mL of triamcinolone. A bandage was applied. The patient tolerated the procedure well. \par Complications: none.

## 2019-10-18 NOTE — PHYSICAL EXAM
[de-identified] : Left knee:\par periarticular TTP\par valgus clinical alignment\par medial laxity\par mild soft tissue swelling

## 2019-10-18 NOTE — HISTORY OF PRESENT ILLNESS
[de-identified] : Patient is a 89 year old male who is here for follow up of left knee pain. Patient had a knee injection in July with complete resolution in pain. He is requesting another injection today.

## 2019-11-19 ENCOUNTER — APPOINTMENT (OUTPATIENT)
Dept: NEUROLOGY | Facility: CLINIC | Age: 84
End: 2019-11-19
Payer: MEDICARE

## 2019-11-19 VITALS
HEIGHT: 63 IN | HEART RATE: 90 BPM | DIASTOLIC BLOOD PRESSURE: 76 MMHG | WEIGHT: 184 LBS | SYSTOLIC BLOOD PRESSURE: 126 MMHG | BODY MASS INDEX: 32.6 KG/M2 | OXYGEN SATURATION: 95 %

## 2019-11-19 DIAGNOSIS — R29.6 REPEATED FALLS: ICD-10-CM

## 2019-11-19 DIAGNOSIS — Z82.49 FAMILY HISTORY OF ISCHEMIC HEART DISEASE AND OTHER DISEASES OF THE CIRCULATORY SYSTEM: ICD-10-CM

## 2019-11-19 PROCEDURE — 99212 OFFICE O/P EST SF 10 MIN: CPT

## 2019-11-19 PROCEDURE — 99202 OFFICE O/P NEW SF 15 MIN: CPT

## 2019-11-19 RX ORDER — DICLOFENAC SODIUM 10 MG/G
1 GEL TOPICAL DAILY
Qty: 1 | Refills: 2 | Status: DISCONTINUED | COMMUNITY
Start: 2018-05-14 | End: 2019-11-19

## 2019-11-19 RX ORDER — PREDNISONE 5 MG/1
5 TABLET ORAL DAILY
Qty: 100 | Refills: 2 | Status: DISCONTINUED | COMMUNITY
Start: 2018-12-31 | End: 2019-11-19

## 2019-11-19 RX ORDER — CAPSAICIN 0.1 G/100G
0.1 CREAM TOPICAL 3 TIMES DAILY
Qty: 1 | Refills: 2 | Status: DISCONTINUED | COMMUNITY
Start: 2018-01-16 | End: 2019-11-19

## 2019-11-19 RX ORDER — PREDNISONE 1 MG/1
1 TABLET ORAL
Qty: 180 | Refills: 5 | Status: DISCONTINUED | COMMUNITY
Start: 2018-01-16 | End: 2019-11-19

## 2019-11-19 RX ORDER — DICLOFENAC SODIUM 10 MG/G
1 GEL TOPICAL DAILY
Qty: 1 | Refills: 2 | Status: DISCONTINUED | COMMUNITY
Start: 2018-03-12 | End: 2019-11-19

## 2019-11-19 RX ORDER — NAPROXEN 375 MG/1
375 TABLET ORAL 3 TIMES DAILY
Qty: 90 | Refills: 0 | Status: DISCONTINUED | COMMUNITY
Start: 2018-06-19 | End: 2019-11-19

## 2019-11-19 RX ORDER — PREDNISONE 1 MG/1
1 TABLET ORAL
Qty: 200 | Refills: 3 | Status: DISCONTINUED | COMMUNITY
Start: 2017-10-13 | End: 2019-11-19

## 2019-11-19 RX ORDER — METHADONE HYDROCHLORIDE 5 MG/1
5 TABLET ORAL 3 TIMES DAILY
Refills: 0 | Status: ACTIVE | COMMUNITY

## 2019-11-19 NOTE — ASSESSMENT
[FreeTextEntry1] : 9-year-old gentleman with a history of back pain most consistent with lateral spinal stenosis he will proceed at this period of time with MRI of his lumbar spine he will follow through with his rehabilitation physician he may be an appropriate candidate for injections into the future. At this point she will continue with conservative treatment medication he will call me following his MRI results.

## 2019-11-19 NOTE — HISTORY OF PRESENT ILLNESS
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 He said the pain is severe he has weakness in his left leg difficulty walking at times feeling as if his left leg is giving out he has no radicular problems in his neck or upper extremities denies any new bowel or bladder difficulties. His had the symptoms for a number of years and feels as though he is getting worse daily.

## 2019-11-19 NOTE — PHYSICAL EXAM
[FreeTextEntry1] : On examination he is a very pleasant 89-year-old gentleman with a history of back pain\par \par He reports weakness of his left leg difficulty walking he throws his left leg with walking he has a very mild left foot drop is weakness in his left leg in an L5 distribution ankle jerks are absent bilaterally toes appear to be downgoing position vibratory sense appeared to be diminished in both lower extremities pulses appear to be intact upper extremities cranial nerves are grossly within normal limits without evidence of cranial or carotid bruits.

## 2019-12-12 ENCOUNTER — OUTPATIENT (OUTPATIENT)
Dept: OUTPATIENT SERVICES | Facility: HOSPITAL | Age: 84
LOS: 1 days | End: 2019-12-12
Payer: MEDICARE

## 2019-12-12 ENCOUNTER — APPOINTMENT (OUTPATIENT)
Dept: MRI IMAGING | Facility: HOSPITAL | Age: 84
End: 2019-12-12
Payer: MEDICARE

## 2019-12-12 DIAGNOSIS — Z98.89 OTHER SPECIFIED POSTPROCEDURAL STATES: Chronic | ICD-10-CM

## 2019-12-12 DIAGNOSIS — Z98.890 OTHER SPECIFIED POSTPROCEDURAL STATES: Chronic | ICD-10-CM

## 2019-12-12 DIAGNOSIS — Z90.89 ACQUIRED ABSENCE OF OTHER ORGANS: Chronic | ICD-10-CM

## 2019-12-12 PROCEDURE — 72148 MRI LUMBAR SPINE W/O DYE: CPT | Mod: 26

## 2019-12-12 PROCEDURE — 72148 MRI LUMBAR SPINE W/O DYE: CPT

## 2019-12-18 ENCOUNTER — APPOINTMENT (OUTPATIENT)
Dept: INTERVENTIONAL RADIOLOGY/VASCULAR | Facility: HOSPITAL | Age: 84
End: 2019-12-18
Payer: MEDICARE

## 2019-12-18 ENCOUNTER — OUTPATIENT (OUTPATIENT)
Dept: OUTPATIENT SERVICES | Facility: HOSPITAL | Age: 84
LOS: 1 days | End: 2019-12-18
Payer: MEDICARE

## 2019-12-18 DIAGNOSIS — Z98.890 OTHER SPECIFIED POSTPROCEDURAL STATES: Chronic | ICD-10-CM

## 2019-12-18 DIAGNOSIS — Z98.89 OTHER SPECIFIED POSTPROCEDURAL STATES: Chronic | ICD-10-CM

## 2019-12-18 DIAGNOSIS — Z90.89 ACQUIRED ABSENCE OF OTHER ORGANS: Chronic | ICD-10-CM

## 2019-12-18 PROCEDURE — 62323 NJX INTERLAMINAR LMBR/SAC: CPT

## 2019-12-23 ENCOUNTER — APPOINTMENT (OUTPATIENT)
Dept: ORTHOPEDIC SURGERY | Facility: CLINIC | Age: 84
End: 2019-12-23
Payer: MEDICARE

## 2019-12-23 PROCEDURE — 99213 OFFICE O/P EST LOW 20 MIN: CPT | Mod: 25

## 2019-12-23 PROCEDURE — 20610 DRAIN/INJ JOINT/BURSA W/O US: CPT | Mod: LT

## 2019-12-24 ENCOUNTER — APPOINTMENT (OUTPATIENT)
Dept: NEUROLOGY | Facility: CLINIC | Age: 84
End: 2019-12-24

## 2019-12-30 NOTE — ASSESSMENT
[FreeTextEntry1] : Assessment/Plan\par \par Left knee arthritis\par \par Will have left knee injection\par \par F/u 90 days\par \par \par All medical record entries made by the PA/Torsten/Fellow are at my, Dr. Alec Brandon's direction and personally dictated by me on 12/23/2019]. I have reviewed the chart and agree that the record accurately reflects my personal performance of the history, physical exam, assessment, and plan. I have also personally directed reviewed, and agreed with the chart.\par \par

## 2019-12-30 NOTE — PHYSICAL EXAM
[de-identified] : Left knee:\par periarticular TTP\par valgus clinical alignment\par medial laxity\par mild soft tissue swelling

## 2019-12-30 NOTE — HISTORY OF PRESENT ILLNESS
[de-identified] : Kat is a 89 year old male who is here for follow up of left knee pain. Patient had a steroid knee injection in 10/16/19 with complete resolution in pain. He is requesting another injection today.

## 2020-02-05 ENCOUNTER — RECORD ABSTRACTING (OUTPATIENT)
Age: 85
End: 2020-02-05

## 2020-02-05 DIAGNOSIS — Z87.898 PERSONAL HISTORY OF OTHER SPECIFIED CONDITIONS: ICD-10-CM

## 2020-02-05 DIAGNOSIS — Z87.448 PERSONAL HISTORY OF OTHER DISEASES OF URINARY SYSTEM: ICD-10-CM

## 2020-02-24 ENCOUNTER — RX RENEWAL (OUTPATIENT)
Age: 85
End: 2020-02-24

## 2020-02-24 RX ORDER — DULOXETINE HYDROCHLORIDE 30 MG/1
30 CAPSULE, DELAYED RELEASE PELLETS ORAL
Qty: 60 | Refills: 3 | Status: ACTIVE | COMMUNITY
Start: 2018-09-14 | End: 1900-01-01

## 2020-03-06 ENCOUNTER — APPOINTMENT (OUTPATIENT)
Dept: ORTHOPEDIC SURGERY | Facility: CLINIC | Age: 85
End: 2020-03-06
Payer: MEDICARE

## 2020-03-06 DIAGNOSIS — M17.12 UNILATERAL PRIMARY OSTEOARTHRITIS, LEFT KNEE: ICD-10-CM

## 2020-03-06 PROCEDURE — 99213 OFFICE O/P EST LOW 20 MIN: CPT | Mod: 25

## 2020-03-06 PROCEDURE — 20610 DRAIN/INJ JOINT/BURSA W/O US: CPT | Mod: LT

## 2020-03-09 NOTE — HISTORY OF PRESENT ILLNESS
[de-identified] : Kat is a 89 year old male here for follow up of left knee pain. Pain has been ongoing for the past year, described as sharp sensation, rated 8/10. Patient is take THC/CBD pills. He last had a left knee injection on 12/23/19 which was effective for roughly 3 months but pain gradually returned within the past 2 weeks. 
Umbilicus with 3 vessels, normal color size and texture/Abdominal wall defects absent/Normal contour/Nontender/Liver palpable < 2 cm below rib margin with sharp edge

## 2020-03-09 NOTE — END OF VISIT
[FreeTextEntry3] : By signing my name below, I, Padmini Miranda, attest that this documentation has been prepared under the direction and in the presence of Luis Daniel Ibrahim PA-C.\par

## 2020-03-09 NOTE — ASSESSMENT
[FreeTextEntry1] : Assessment/Plan\par Left knee OA\par \par The left knee was injected as described above, today they will rest ice and use Tylenol as needed for pain.\par \par  F/U in 3 months \par \par All medical record entries made by the PA/Scribe/Fellow are at my, Dr. Alec Brandon's direction and personally dictated by me on 03/06/2020]. I have reviewed the chart and agree that the record accurately reflects my personal performance of the history, physical exam, assessment, and plan. I have also personally directed reviewed, and agreed with the chart.\par \par \par

## 2020-03-09 NOTE — PROCEDURE
[de-identified] : After obtaining verbal consent and under normal sterile conditions the left knee joint was injected with 4 cc of lidocaine and 1 cc of 40 mg per mL of Kenalog.\par \par

## 2020-03-09 NOTE — PHYSICAL EXAM
[de-identified] : General: Not in acute distress, dressed appropriately, sitting on examination table\par Skin: Warm and dry, normal turgor, no rashes\par Neurological: AOx3, Cranial nerves grossly in tact\par Psych: Mood and affect appropriate\par \par Left Knee: Alignment: Neutral Tender: Medially ROM: 0-120 Stable 5/5 Strength. DNVI. Ambulates with no assisted devices. \par \par General: Not in acute distress, dressed appropriately, sitting on examination table\par Skin: Warm and dry, normal turgor, no rashes\par Neurological: AOx3, Cranial nerves grossly in tact\par Psych: Mood and affect appropriate\par \par Right Knee: No swelling edema erythema redness or drainage. tender anteriorly. Alignment: Neutral ROM: 0-120 Stable. 5/5 Strength. DNVI. 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

## 2020-04-22 ENCOUNTER — APPOINTMENT (OUTPATIENT)
Dept: INTERVENTIONAL RADIOLOGY/VASCULAR | Facility: HOSPITAL | Age: 85
End: 2020-04-22

## 2020-05-06 ENCOUNTER — APPOINTMENT (OUTPATIENT)
Dept: NEUROLOGY | Facility: CLINIC | Age: 85
End: 2020-05-06
Payer: MEDICARE

## 2020-05-06 DIAGNOSIS — M54.16 RADICULOPATHY, LUMBAR REGION: ICD-10-CM

## 2020-05-06 PROCEDURE — 99212 OFFICE O/P EST SF 10 MIN: CPT | Mod: 95

## 2020-05-06 NOTE — HISTORY OF PRESENT ILLNESS
[Home] : at home, [unfilled] , at the time of the visit. [Other Location: e.g. Home (Enter Location, City,State)___] : at [unfilled] [Patient] : the patient [FreeTextEntry1] : 69 year old with lumbar spinal stenosis - back pain - no tremors- bladder symptoms -\par no headaches or dizziness -\par Lumbar spinal stenosis -\par No syncope\par \par Awake and alert- No aphasia\par  Gait unsteady- no foot drop\par  No tremors or dysmetria\par  decrease ROM of neck\par \par Lumbar spinal stenosis\par  No change in PT or pain meds \par  Urology referral \par \par

## 2020-10-30 NOTE — H&P ADULT - NSHPREVIEWOFSYSTEMS_GEN_ALL_CORE
REVIEW OF SYSTEMS:    CONSTITUTIONAL: Patient denies fevers or chills  RESPIRATORY: Patient denies cough or shortness of breath  CARDIOVASCULAR: Patient denies chest pain or palpitations  GASTROINTESTINAL: Patient denies abdominal or epigastric pain, nausea, vomiting, diarrhea or constipation. + hematochezia.  GENITOURINARY: Patient denies dysuria, frequency or hematuria  MUSCULOSKELETAL: +back pain    All other review of systems is negative unless indicated above. REVIEW OF SYSTEMS:  CONSTITUTIONAL: Patient denies fevers or chills  RESPIRATORY: Patient denies cough or shortness of breath  CARDIOVASCULAR: Patient denies chest pain or palpitations  GASTROINTESTINAL: Patient denies abdominal or epigastric pain, nausea, vomiting, diarrhea or constipation. + hematochezia.  GENITOURINARY: Patient denies dysuria, frequency or hematuria  MUSCULOSKELETAL: +back pain    All other review of systems is negative unless indicated above. Well-developed, well nourished

## 2021-07-22 ENCOUNTER — APPOINTMENT (OUTPATIENT)
Dept: HEART AND VASCULAR | Facility: CLINIC | Age: 86
End: 2021-07-22
Payer: MEDICARE

## 2021-07-22 VITALS
TEMPERATURE: 97 F | WEIGHT: 187 LBS | SYSTOLIC BLOOD PRESSURE: 195 MMHG | HEIGHT: 63 IN | BODY MASS INDEX: 33.13 KG/M2 | DIASTOLIC BLOOD PRESSURE: 111 MMHG

## 2021-07-22 VITALS — DIASTOLIC BLOOD PRESSURE: 107 MMHG | SYSTOLIC BLOOD PRESSURE: 183 MMHG

## 2021-07-22 PROCEDURE — 93306 TTE W/DOPPLER COMPLETE: CPT

## 2021-07-22 PROCEDURE — 93000 ELECTROCARDIOGRAM COMPLETE: CPT

## 2021-07-22 PROCEDURE — 99213 OFFICE O/P EST LOW 20 MIN: CPT | Mod: 25

## 2021-07-22 RX ORDER — SILODOSIN 4 MG/1
4 CAPSULE ORAL
Refills: 0 | Status: DISCONTINUED | COMMUNITY
End: 2021-07-22

## 2021-07-22 NOTE — ASSESSMENT
[FreeTextEntry1] : Patient is very depressed patient has mild to moderate aortic stenosis patient has severe back pain and he is on methadone gabapentin and occasional NSAIDs as well as Voltaren gel patient had blood work with Dr. Schmid cysts will review

## 2021-07-22 NOTE — REVIEW OF SYSTEMS
[Feeling Fatigued] : feeling fatigued [Dyspnea on exertion] : dyspnea during exertion [Joint Pain] : joint pain [Depression] : depression [Anxiety] : anxiety [Negative] : Integumentary

## 2021-07-22 NOTE — PHYSICAL EXAM
[Ill-Appearing] : ill-appearing [Murmur] : murmur [Abnormal Gait] : abnormal gait [Normal] : no edema, no cyanosis, no clubbing, no varicosities [de-identified] : 2/6 lissett

## 2021-08-09 ENCOUNTER — NON-APPOINTMENT (OUTPATIENT)
Age: 86
End: 2021-08-09

## 2024-06-05 NOTE — ED ADULT NURSE NOTE - CHIEF COMPLAINT
Chief Complaint   Patient presents with    Labs Only     Blood drawn via VPT by LPN. Pt checked into appt.      RACHEL Bernard LPN   The patient is a 88y Male complaining of rectal bleeding.